# Patient Record
Sex: FEMALE | Race: WHITE | HISPANIC OR LATINO | Employment: FULL TIME | URBAN - METROPOLITAN AREA
[De-identification: names, ages, dates, MRNs, and addresses within clinical notes are randomized per-mention and may not be internally consistent; named-entity substitution may affect disease eponyms.]

---

## 2018-05-31 ENCOUNTER — OFFICE VISIT (OUTPATIENT)
Dept: URGENT CARE | Facility: CLINIC | Age: 53
End: 2018-05-31
Payer: COMMERCIAL

## 2018-05-31 VITALS
HEART RATE: 74 BPM | RESPIRATION RATE: 16 BRPM | DIASTOLIC BLOOD PRESSURE: 52 MMHG | SYSTOLIC BLOOD PRESSURE: 103 MMHG | TEMPERATURE: 96.7 F | OXYGEN SATURATION: 98 %

## 2018-05-31 DIAGNOSIS — L50.9 URTICARIA: Primary | ICD-10-CM

## 2018-05-31 PROCEDURE — 99203 OFFICE O/P NEW LOW 30 MIN: CPT | Performed by: NURSE PRACTITIONER

## 2018-05-31 RX ORDER — PREDNISONE 10 MG/1
TABLET ORAL
Qty: 30 TABLET | Refills: 0 | Status: SHIPPED | OUTPATIENT
Start: 2018-05-31 | End: 2020-11-17 | Stop reason: ALTCHOICE

## 2018-05-31 RX ORDER — RANITIDINE 300 MG/1
300 CAPSULE ORAL 2 TIMES DAILY
Qty: 20 CAPSULE | Refills: 0 | Status: SHIPPED | OUTPATIENT
Start: 2018-05-31 | End: 2021-12-07 | Stop reason: ALTCHOICE

## 2018-05-31 NOTE — PROGRESS NOTES
[email or web address] [facility]        NAME: Ira Parisi is a 47 y o  female  : 1965    MRN: [id number]  DATE: May 31, 2018  TIME: 9:36 AM    Assessment and Plan   Urticaria [L50 9]  1  Urticaria  predniSONE 10 mg tablet    ranitidine (ZANTAC) 300 MG capsule         Patient Instructions   I advised her to use Claritin during the day and Benadryl at night  She may apply hydrocortisone cream as needed  Follow up with PCP in 3-5 days  Proceed to  ER if symptoms worsen  Chief Complaint     Chief Complaint   Patient presents with    Rash     face, x 5 days         History of Present Illness       Rash   The current episode started in the past 7 days  The problem has been gradually worsening since onset  Location: forehead and a couple scattered areas on arms  The rash is characterized by itchiness, redness and swelling  She was exposed to nothing  Past treatments include nothing  Review of Systems   Review of Systems   Constitutional: Negative  HENT: Negative  Eyes: Negative  Respiratory: Negative  Cardiovascular: Negative  Gastrointestinal: Negative  Endocrine: Negative  Genitourinary: Negative  Musculoskeletal: Negative  Skin: Positive for rash  Neurological: Negative  Hematological: Negative  Psychiatric/Behavioral: Negative            Current Medications       Current Outpatient Prescriptions:     predniSONE 10 mg tablet, 5 tabs once daily x 2 days then decrease by 1 tab every other day until finished, Disp: 30 tablet, Rfl: 0    ranitidine (ZANTAC) 300 MG capsule, Take 1 capsule (300 mg total) by mouth 2 (two) times a day for 10 days, Disp: 20 capsule, Rfl: 0    Current Allergies     Allergies as of 2018    (No Known Allergies)            The following portions of the patient's history were reviewed and updated as appropriate: allergies, current medications, past family history, past medical history, past social history, past surgical history and problem list      History reviewed  No pertinent past medical history  No past surgical history on file  No family history on file  Medications have been verified  Objective   /52   Pulse 74   Temp (!) 96 7 °F (35 9 °C)   Resp 16   SpO2 98%        Physical Exam     Physical Exam   Constitutional: She is oriented to person, place, and time  She appears well-developed and well-nourished  No distress  HENT:   Head: Normocephalic and atraumatic  Right Ear: External ear normal    Left Ear: External ear normal    Nose: Nose normal    Mouth/Throat: Oropharynx is clear and moist    Eyes: Conjunctivae and EOM are normal  Pupils are equal, round, and reactive to light  Neck: Normal range of motion  Neck supple  Pulmonary/Chest: Effort normal and breath sounds normal    Abdominal: Soft  Bowel sounds are normal    Neurological: She is alert and oriented to person, place, and time  She has normal reflexes  Skin: Skin is warm and dry  Rash noted  Rash is urticarial (small areas scattered on forehead and left temple  a few area on both forearms  )  She is not diaphoretic  Psychiatric: She has a normal mood and affect  Nursing note and vitals reviewed

## 2018-05-31 NOTE — PATIENT INSTRUCTIONS
Urticaria   WHAT YOU NEED TO KNOW:   Urticaria is also called hives  Hives can change size and shape, and appear anywhere on your skin  They can be mild or severe and last from a few minutes to a few days  Hives may be a sign of a severe allergic reaction called anaphylaxis that needs immediate treatment  Urticaria that lasts longer than 6 weeks may be a chronic condition that needs long-term treatment  DISCHARGE INSTRUCTIONS:   Call 911 for signs or symptoms of anaphylaxis,  such as trouble breathing, swelling in your mouth or throat, or wheezing  You may also have itching, a rash, or feel like you are going to faint  Return to the emergency department if:   · Your heart is beating faster than it normally does  · You have cramping or severe pain in your abdomen  Contact your healthcare provider if:   · You have a fever  · Your skin still itches 24 hours after you take your medicine  · You still have hives after 7 days  · Your joints are painful and swollen  · You have questions or concerns about your condition or care  Medicines:   · Epinephrine  is used to treat severe allergic reactions such as anaphylaxis  · Antihistamines  decrease mild symptoms such as itching or a rash  · Steroids  decrease redness, pain, and swelling  · Take your medicine as directed  Contact your healthcare provider if you think your medicine is not helping or if you have side effects  Tell him of her if you are allergic to any medicine  Keep a list of the medicines, vitamins, and herbs you take  Include the amounts, and when and why you take them  Bring the list or the pill bottles to follow-up visits  Carry your medicine list with you in case of an emergency  Steps to take for signs or symptoms of anaphylaxis:   · Immediately  give 1 shot of epinephrine only into the outer thigh muscle  · Leave the shot in place  as directed   Your healthcare provider may recommend you leave it in place for up to 10 seconds before you remove it  This helps make sure all of the epinephrine is delivered  · Call 911 and go to the emergency department,  even if the shot improved symptoms  Do not drive yourself  Bring the used epinephrine shot with you  Safety precautions to take if you are at risk for anaphylaxis:   · Keep 2 shots of epinephrine with you at all times  You may need a second shot, because epinephrine only works for about 20 minutes and symptoms may return  Your healthcare provider can show you and family members how to give the shot  Check the expiration date every month and replace it before it expires  · Create an action plan  Your healthcare provider can help you create a written plan that explains the allergy and an emergency plan to treat a reaction  The plan explains when to give a second epinephrine shot if symptoms return or do not improve after the first  Give copies of the action plan and emergency instructions to family members, work and school staff, and  providers  Show them how to give a shot of epinephrine  · Be careful when you exercise  If you have had exercise-induced anaphylaxis, do not exercise right after you eat  Stop exercising right away if you start to develop any signs or symptoms of anaphylaxis  You may first feel tired, warm, or have itchy skin  Hives, swelling, and severe breathing problems may develop if you continue to exercise  · Carry medical alert identification  Wear medical alert jewelry or carry a card that explains the allergy  Ask your healthcare provider where to get these items  · Keep a record of triggers and symptoms  Record everything you eat, drink, or apply to your skin for 3 weeks  Include stressful events and what you were doing right before your hives started  Bring the record with you to follow-up visits with your healthcare provider  Manage urticaria:   · Cool your skin  This may help decrease itching  Apply a cool pack to your hives  Dip a hand towel in cool water, wring it out, and place it on your hives  You may also soak your skin in a cool oatmeal bath  · Do not rub your hives  This can irritate your skin and cause more hives  · Wear loose clothing  Tight clothes may irritate your skin and cause more hives  · Manage stress  Stress may trigger hives, or make them worse  Learn new ways to relax, such as deep breathing  Follow up with your healthcare provider as directed:  Write down your questions so you remember to ask them during your visits  © 2017 [address] [employer] Information is for End User's use only and may not be sold, redistributed or otherwise used for commercial purposes  All illustrations and images included in CareNotes® are the copyrighted property of [employer]  or [address]  The above information is an  only  It is not intended as medical advice for individual conditions or treatments  Talk to your doctor, nurse or pharmacist before following any medical regimen to see if it is safe and effective for you

## 2018-09-29 ENCOUNTER — TRANSCRIBE ORDERS (OUTPATIENT)
Dept: ADMINISTRATIVE | Facility: HOSPITAL | Age: 53
End: 2018-09-29

## 2018-09-29 ENCOUNTER — APPOINTMENT (OUTPATIENT)
Dept: LAB | Facility: HOSPITAL | Age: 53
End: 2018-09-29
Payer: COMMERCIAL

## 2018-09-29 DIAGNOSIS — R53.83 FATIGUE, UNSPECIFIED TYPE: ICD-10-CM

## 2018-09-29 DIAGNOSIS — E78.5 HYPERLIPIDEMIA, UNSPECIFIED HYPERLIPIDEMIA TYPE: ICD-10-CM

## 2018-09-29 DIAGNOSIS — D49.3 BREAST TUMOR: ICD-10-CM

## 2018-09-29 DIAGNOSIS — D49.3 BREAST TUMOR: Primary | ICD-10-CM

## 2018-09-29 LAB
ALBUMIN SERPL BCP-MCNC: 4.1 G/DL (ref 3.5–5.7)
ALP SERPL-CCNC: 59 U/L (ref 40–150)
ALT SERPL W P-5'-P-CCNC: 7 U/L (ref 7–52)
ANION GAP SERPL CALCULATED.3IONS-SCNC: 6 MMOL/L (ref 4–13)
AST SERPL W P-5'-P-CCNC: 18 U/L (ref 13–39)
BASOPHILS # BLD AUTO: 0 THOUSANDS/ΜL (ref 0–0.1)
BASOPHILS NFR BLD AUTO: 1 % (ref 0–2)
BILIRUB SERPL-MCNC: 0.5 MG/DL (ref 0.2–1)
BUN SERPL-MCNC: 12 MG/DL (ref 7–25)
CALCIUM SERPL-MCNC: 9.7 MG/DL (ref 8.6–10.5)
CHLORIDE SERPL-SCNC: 106 MMOL/L (ref 98–107)
CHOLEST SERPL-MCNC: 183 MG/DL (ref 0–200)
CO2 SERPL-SCNC: 30 MMOL/L (ref 21–31)
CREAT SERPL-MCNC: 0.77 MG/DL (ref 0.6–1.2)
EOSINOPHIL # BLD AUTO: 0.2 THOUSAND/ΜL (ref 0–0.61)
EOSINOPHIL NFR BLD AUTO: 5 % (ref 0–5)
ERYTHROCYTE [DISTWIDTH] IN BLOOD BY AUTOMATED COUNT: 13.5 % (ref 11.5–14.5)
GFR SERPL CREATININE-BSD FRML MDRD: 90 ML/MIN/1.73SQ M
GLUCOSE P FAST SERPL-MCNC: 92 MG/DL (ref 65–99)
HCT VFR BLD AUTO: 39.8 % (ref 34.8–46.1)
HDLC SERPL-MCNC: 71 MG/DL (ref 40–60)
HGB BLD-MCNC: 13.3 G/DL (ref 12–16)
LDLC SERPL CALC-MCNC: 100 MG/DL (ref 75–193)
LYMPHOCYTES # BLD AUTO: 1.7 THOUSANDS/ΜL (ref 0.6–4.47)
LYMPHOCYTES NFR BLD AUTO: 41 % (ref 21–51)
MCH RBC QN AUTO: 31.1 PG (ref 26–34)
MCHC RBC AUTO-ENTMCNC: 33.3 G/DL (ref 31–37)
MCV RBC AUTO: 93 FL (ref 81–99)
MONOCYTES # BLD AUTO: 0.3 THOUSAND/ΜL (ref 0.17–1.22)
MONOCYTES NFR BLD AUTO: 8 % (ref 2–12)
NEUTROPHILS # BLD AUTO: 2 THOUSANDS/ΜL (ref 1.4–6.5)
NEUTS SEG NFR BLD AUTO: 45 % (ref 42–75)
NONHDLC SERPL-MCNC: 112 MG/DL
NRBC BLD AUTO-RTO: 0 /100 WBCS
PLATELET # BLD AUTO: 222 THOUSANDS/UL (ref 149–390)
PMV BLD AUTO: 7.8 FL (ref 8.6–11.7)
POTASSIUM SERPL-SCNC: 4.2 MMOL/L (ref 3.5–5.5)
PROT SERPL-MCNC: 6.8 G/DL (ref 6.4–8.9)
RBC # BLD AUTO: 4.26 MILLION/UL (ref 3.9–5.2)
SODIUM SERPL-SCNC: 142 MMOL/L (ref 134–143)
TRIGL SERPL-MCNC: 61 MG/DL (ref 44–166)
TSH SERPL DL<=0.05 MIU/L-ACNC: 1.32 UIU/ML (ref 0.45–5.33)
WBC # BLD AUTO: 4.3 THOUSAND/UL (ref 4.8–10.8)

## 2018-09-29 PROCEDURE — 80061 LIPID PANEL: CPT

## 2018-09-29 PROCEDURE — 85025 COMPLETE CBC W/AUTO DIFF WBC: CPT

## 2018-09-29 PROCEDURE — 36415 COLL VENOUS BLD VENIPUNCTURE: CPT

## 2018-09-29 PROCEDURE — 80053 COMPREHEN METABOLIC PANEL: CPT

## 2018-09-29 PROCEDURE — 84443 ASSAY THYROID STIM HORMONE: CPT

## 2019-08-29 ENCOUNTER — OFFICE VISIT (OUTPATIENT)
Dept: FAMILY MEDICINE CLINIC | Facility: CLINIC | Age: 54
End: 2019-08-29
Payer: COMMERCIAL

## 2019-08-29 VITALS
OXYGEN SATURATION: 98 % | WEIGHT: 148.3 LBS | SYSTOLIC BLOOD PRESSURE: 106 MMHG | HEIGHT: 63 IN | BODY MASS INDEX: 26.28 KG/M2 | DIASTOLIC BLOOD PRESSURE: 66 MMHG | HEART RATE: 74 BPM

## 2019-08-29 DIAGNOSIS — C50.919 MALIGNANT NEOPLASM OF FEMALE BREAST, UNSPECIFIED ESTROGEN RECEPTOR STATUS, UNSPECIFIED LATERALITY, UNSPECIFIED SITE OF BREAST (HCC): ICD-10-CM

## 2019-08-29 DIAGNOSIS — E78.5 DYSLIPIDEMIA: ICD-10-CM

## 2019-08-29 DIAGNOSIS — H10.022 MUCOPURULENT CONJUNCTIVITIS OF LEFT EYE: Primary | ICD-10-CM

## 2019-08-29 DIAGNOSIS — R53.83 OTHER FATIGUE: ICD-10-CM

## 2019-08-29 DIAGNOSIS — N64.4 BREAST PAIN: ICD-10-CM

## 2019-08-29 PROCEDURE — 99213 OFFICE O/P EST LOW 20 MIN: CPT | Performed by: FAMILY MEDICINE

## 2019-08-29 RX ORDER — TOBRAMYCIN 3 MG/ML
1 SOLUTION/ DROPS OPHTHALMIC
Qty: 5 ML | Refills: 1 | Status: SHIPPED | OUTPATIENT
Start: 2019-08-29 | End: 2021-12-07 | Stop reason: ALTCHOICE

## 2019-08-29 RX ORDER — ALBUTEROL SULFATE 90 UG/1
2 AEROSOL, METERED RESPIRATORY (INHALATION) AS NEEDED
COMMUNITY
End: 2020-03-17 | Stop reason: SDUPTHER

## 2019-08-29 RX ORDER — TOBRAMYCIN 3 MG/ML
1 SOLUTION/ DROPS OPHTHALMIC
Qty: 5 ML | Refills: 1 | Status: SHIPPED | OUTPATIENT
Start: 2019-08-29 | End: 2019-08-29 | Stop reason: SDUPTHER

## 2019-08-29 NOTE — ASSESSMENT & PLAN NOTE
Patient reports breast pain  She is status post bilateral mastectomy and had breast reconstruction surgery  She does not want a mammogram   She asked if she could have an ultrasound of the breast   This was ordered

## 2019-08-29 NOTE — ASSESSMENT & PLAN NOTE
Patient has an acute conjunctivitis  She was started on antibiotic eyedrops  She was advised to use 1 drop to left eye every 4 hours while awake  I told her to make sure she does not the least 4 times per day  I wanted to use the drops for 5-7 days  Patient has been advised to wash her hands frequently  Her  should wash his hands frequently as well  The patient needs to use a separate washcloth and talc from her   When she improved, the towel, wash cloth, and sheets and pillow cases need to be watched well  Return to office if no improvement or worsens

## 2019-08-29 NOTE — PROGRESS NOTES
Assessment/Plan:    Mucopurulent conjunctivitis of left eye  Patient has an acute conjunctivitis  She was started on antibiotic eyedrops  She was advised to use 1 drop to left eye every 4 hours while awake  I told her to make sure she does not the least 4 times per day  I wanted to use the drops for 5-7 days  Patient has been advised to wash her hands frequently  Her  should wash his hands frequently as well  The patient needs to use a separate washcloth and talc from her   When she improved, the towel, wash cloth, and sheets and pillow cases need to be watched well  Return to office if no improvement or worsens  Breast pain  Patient reports breast pain  She is status post bilateral mastectomy and had breast reconstruction surgery  She does not want a mammogram   She asked if she could have an ultrasound of the breast   This was ordered  Dyslipidemia  Patient's annual fasting blood work was ordered  Diagnoses and all orders for this visit:    Mucopurulent conjunctivitis of left eye  -     Discontinue: tobramycin (TOBREX) 0 3 % SOLN; Administer 1 drop into the left eye every 4 (four) hours while awake  -     tobramycin (TOBREX) 0 3 % SOLN; Administer 1 drop into the left eye every 4 (four) hours while awake    Other fatigue  -     CBC and differential; Future  -     Comprehensive metabolic panel; Future  -     TSH, 3rd generation with Free T4 reflex; Future    Dyslipidemia  -     Lipid panel; Future    Malignant neoplasm of female breast, unspecified estrogen receptor status, unspecified laterality, unspecified site of breast ([facility] [address] )  -     US breast left limited (diagnostic); Future  -     US breast right limited (diagnostic); Future    Breast pain  -     US breast left limited (diagnostic); Future  -     US breast right limited (diagnostic); Future    Other orders  -     albuterol (PROVENTIL HFA,VENTOLIN HFA) 90 mcg/act inhaler;  Inhale 2 puffs as needed for wheezing Subjective:      Patient ID: Kristi Huerta is a 47 y o  female  This patient is a 50-year-old  female who presents to the office today complaining of pinkeye  She tells me she woke up this morning with her eye being red and irritated  She tells me it stings  She reports copious discharge from her eye  She works at [employer]  The following portions of the patient's history were reviewed and updated as appropriate: allergies, current medications, past family history, past medical history, past social history, past surgical history and problem list     Review of Systems   HENT: Negative for congestion, rhinorrhea, sinus pressure, sneezing and sore throat  Cardiovascular: Negative for chest pain and leg swelling  Genitourinary:        Reports bilateral breast pain, right more so than left         Objective:      /66 (BP Location: Left arm, Patient Position: Sitting, Cuff Size: Adult)   Pulse 74   Ht 5' 3" (1 6 m)   Wt 67 3 kg (148 lb 4 8 oz)   SpO2 98%   BMI 26 27 kg/m²          Physical Exam   Constitutional: She appears well-developed and well-nourished  No distress  HENT:   Head: Normocephalic and atraumatic  Right Ear: External ear normal    Left Ear: External ear normal    Mouth/Throat: Oropharynx is clear and moist    Tympanic membranes are clear   Eyes: Pupils are equal, round, and reactive to light  Left eye exhibits discharge  The left eye conjunctiva is injected  There is a small amount of mucoid purulent discharge on the eyelashes  It should be noted that the pupils were equal and reactive to light  Neck: Neck supple  No tracheal deviation present  No thyromegaly present  Cardiovascular: Normal rate, regular rhythm and normal heart sounds  Exam reveals no gallop and no friction rub  No murmur heard  Pulmonary/Chest: Effort normal and breath sounds normal  No stridor  No respiratory distress  She has no wheezes  She has no rales  Abdominal: Soft  Bowel sounds are normal  She exhibits no distension and no mass  There is no tenderness  There is no rebound and no guarding  Lymphadenopathy:     She has no cervical adenopathy  Skin: She is not diaphoretic  Vitals reviewed      Extremities:  Without cyanosis, clubbing, or edema

## 2019-08-30 ENCOUNTER — APPOINTMENT (OUTPATIENT)
Dept: LAB | Facility: HOSPITAL | Age: 54
End: 2019-08-30
Payer: COMMERCIAL

## 2019-08-30 DIAGNOSIS — E78.5 DYSLIPIDEMIA: ICD-10-CM

## 2019-08-30 DIAGNOSIS — R53.83 OTHER FATIGUE: ICD-10-CM

## 2019-08-30 LAB
ALBUMIN SERPL BCP-MCNC: 4.4 G/DL (ref 3.5–5.7)
ALP SERPL-CCNC: 44 U/L (ref 40–150)
ALT SERPL W P-5'-P-CCNC: 9 U/L (ref 7–52)
ANION GAP SERPL CALCULATED.3IONS-SCNC: 6 MMOL/L (ref 4–13)
AST SERPL W P-5'-P-CCNC: 25 U/L (ref 13–39)
BASOPHILS # BLD AUTO: 0 THOUSANDS/ΜL (ref 0–0.1)
BASOPHILS NFR BLD AUTO: 1 % (ref 0–2)
BILIRUB SERPL-MCNC: 0.6 MG/DL (ref 0.2–1)
BUN SERPL-MCNC: 13 MG/DL (ref 7–25)
CALCIUM SERPL-MCNC: 10 MG/DL (ref 8.6–10.5)
CHLORIDE SERPL-SCNC: 103 MMOL/L (ref 98–107)
CHOLEST SERPL-MCNC: 189 MG/DL (ref 0–200)
CO2 SERPL-SCNC: 30 MMOL/L (ref 21–31)
CREAT SERPL-MCNC: 0.87 MG/DL (ref 0.6–1.2)
EOSINOPHIL # BLD AUTO: 0.3 THOUSAND/ΜL (ref 0–0.61)
EOSINOPHIL NFR BLD AUTO: 8 % (ref 0–5)
ERYTHROCYTE [DISTWIDTH] IN BLOOD BY AUTOMATED COUNT: 13.1 % (ref 11.5–14.5)
GFR SERPL CREATININE-BSD FRML MDRD: 77 ML/MIN/1.73SQ M
GLUCOSE P FAST SERPL-MCNC: 79 MG/DL (ref 65–99)
HCT VFR BLD AUTO: 41.2 % (ref 42–47)
HDLC SERPL-MCNC: 56 MG/DL (ref 40–60)
HGB BLD-MCNC: 13.6 G/DL (ref 12–16)
LDLC SERPL CALC-MCNC: 120 MG/DL (ref 0–100)
LYMPHOCYTES # BLD AUTO: 1.9 THOUSANDS/ΜL (ref 0.6–4.47)
LYMPHOCYTES NFR BLD AUTO: 50 % (ref 21–51)
MCH RBC QN AUTO: 30.8 PG (ref 26–34)
MCHC RBC AUTO-ENTMCNC: 33.1 G/DL (ref 31–37)
MCV RBC AUTO: 93 FL (ref 81–99)
MONOCYTES # BLD AUTO: 0.4 THOUSAND/ΜL (ref 0.17–1.22)
MONOCYTES NFR BLD AUTO: 9 % (ref 2–12)
NEUTROPHILS # BLD AUTO: 1.2 THOUSANDS/ΜL (ref 1.4–6.5)
NEUTS SEG NFR BLD AUTO: 32 % (ref 42–75)
NONHDLC SERPL-MCNC: 133 MG/DL
PLATELET # BLD AUTO: 210 THOUSANDS/UL (ref 149–390)
PMV BLD AUTO: 8.7 FL (ref 8.6–11.7)
POTASSIUM SERPL-SCNC: 4 MMOL/L (ref 3.5–5.5)
PROT SERPL-MCNC: 7.5 G/DL (ref 6.4–8.9)
RBC # BLD AUTO: 4.42 MILLION/UL (ref 3.9–5.2)
SODIUM SERPL-SCNC: 139 MMOL/L (ref 134–143)
TRIGL SERPL-MCNC: 66 MG/DL (ref 44–166)
TSH SERPL DL<=0.05 MIU/L-ACNC: 1.66 UIU/ML (ref 0.45–5.33)
WBC # BLD AUTO: 3.8 THOUSAND/UL (ref 4.8–10.8)

## 2019-08-30 PROCEDURE — 84443 ASSAY THYROID STIM HORMONE: CPT

## 2019-08-30 PROCEDURE — 36415 COLL VENOUS BLD VENIPUNCTURE: CPT

## 2019-08-30 PROCEDURE — 85025 COMPLETE CBC W/AUTO DIFF WBC: CPT

## 2019-08-30 PROCEDURE — 80061 LIPID PANEL: CPT

## 2019-08-30 PROCEDURE — 80053 COMPREHEN METABOLIC PANEL: CPT

## 2019-09-13 ENCOUNTER — HOSPITAL ENCOUNTER (OUTPATIENT)
Dept: ULTRASOUND IMAGING | Facility: HOSPITAL | Age: 54
Discharge: HOME | End: 2019-09-13
Payer: COMMERCIAL

## 2019-09-13 DIAGNOSIS — C50.919 MALIGNANT NEOPLASM OF FEMALE BREAST, UNSPECIFIED ESTROGEN RECEPTOR STATUS, UNSPECIFIED LATERALITY, UNSPECIFIED SITE OF BREAST (HCC): ICD-10-CM

## 2019-09-13 DIAGNOSIS — N64.4 BREAST PAIN: ICD-10-CM

## 2019-09-13 PROCEDURE — 76642 ULTRASOUND BREAST LIMITED: CPT

## 2020-03-17 DIAGNOSIS — J45.20 MILD INTERMITTENT ASTHMA, UNSPECIFIED WHETHER COMPLICATED: ICD-10-CM

## 2020-03-17 DIAGNOSIS — N89.8 VAGINAL ITCHING: Primary | ICD-10-CM

## 2020-03-17 RX ORDER — ALBUTEROL SULFATE 90 UG/1
2 AEROSOL, METERED RESPIRATORY (INHALATION) AS NEEDED
Qty: 1 INHALER | Refills: 3 | Status: SHIPPED | OUTPATIENT
Start: 2020-03-17 | End: 2021-12-07 | Stop reason: SDUPTHER

## 2020-03-17 RX ORDER — CLOBETASOL PROPIONATE 0.5 MG/G
1 OINTMENT TOPICAL AS NEEDED
COMMUNITY
End: 2020-03-17 | Stop reason: SDUPTHER

## 2020-03-17 RX ORDER — CLOBETASOL PROPIONATE 0.5 MG/G
1 OINTMENT TOPICAL AS NEEDED
Qty: 30 G | Refills: 0 | Status: SHIPPED | OUTPATIENT
Start: 2020-03-17 | End: 2020-12-08 | Stop reason: SDUPTHER

## 2020-06-14 ENCOUNTER — APPOINTMENT (EMERGENCY)
Dept: RADIOLOGY | Facility: HOSPITAL | Age: 55
End: 2020-06-14
Payer: COMMERCIAL

## 2020-06-14 ENCOUNTER — HOSPITAL ENCOUNTER (EMERGENCY)
Facility: HOSPITAL | Age: 55
Discharge: HOME | End: 2020-06-14
Attending: EMERGENCY MEDICINE | Admitting: EMERGENCY MEDICINE
Payer: COMMERCIAL

## 2020-06-14 VITALS
RESPIRATION RATE: 18 BRPM | SYSTOLIC BLOOD PRESSURE: 121 MMHG | WEIGHT: 155 LBS | OXYGEN SATURATION: 100 % | DIASTOLIC BLOOD PRESSURE: 61 MMHG | HEART RATE: 83 BPM | TEMPERATURE: 97 F | HEIGHT: 63 IN | BODY MASS INDEX: 27.46 KG/M2

## 2020-06-14 DIAGNOSIS — T18.9XXA SWALLOWED FOREIGN BODY: Primary | ICD-10-CM

## 2020-06-14 PROCEDURE — 71045 X-RAY EXAM CHEST 1 VIEW: CPT

## 2020-06-14 PROCEDURE — 99282 EMERGENCY DEPT VISIT SF MDM: CPT | Performed by: EMERGENCY MEDICINE

## 2020-06-14 PROCEDURE — 99283 EMERGENCY DEPT VISIT LOW MDM: CPT

## 2020-06-16 ENCOUNTER — VBI (OUTPATIENT)
Dept: FAMILY MEDICINE CLINIC | Facility: CLINIC | Age: 55
End: 2020-06-16

## 2020-10-14 ENCOUNTER — TELEMEDICINE (OUTPATIENT)
Dept: FAMILY MEDICINE CLINIC | Facility: CLINIC | Age: 55
End: 2020-10-14
Payer: COMMERCIAL

## 2020-10-14 VITALS — WEIGHT: 150 LBS | BODY MASS INDEX: 26.57 KG/M2

## 2020-10-14 DIAGNOSIS — Z20.828 EXPOSURE TO SARS-ASSOCIATED CORONAVIRUS: Primary | ICD-10-CM

## 2020-10-14 PROCEDURE — 1036F TOBACCO NON-USER: CPT | Performed by: FAMILY MEDICINE

## 2020-10-14 PROCEDURE — U0003 INFECTIOUS AGENT DETECTION BY NUCLEIC ACID (DNA OR RNA); SEVERE ACUTE RESPIRATORY SYNDROME CORONAVIRUS 2 (SARS-COV-2) (CORONAVIRUS DISEASE [COVID-19]), AMPLIFIED PROBE TECHNIQUE, MAKING USE OF HIGH THROUGHPUT TECHNOLOGIES AS DESCRIBED BY CMS-2020-01-R: HCPCS | Performed by: FAMILY MEDICINE

## 2020-10-14 PROCEDURE — 99213 OFFICE O/P EST LOW 20 MIN: CPT | Performed by: FAMILY MEDICINE

## 2020-10-15 LAB — SARS-COV-2 RNA SPEC QL NAA+PROBE: NOT DETECTED

## 2020-10-16 ENCOUNTER — TELEPHONE (OUTPATIENT)
Dept: FAMILY MEDICINE CLINIC | Facility: CLINIC | Age: 55
End: 2020-10-16

## 2020-11-17 ENCOUNTER — OFFICE VISIT (OUTPATIENT)
Dept: FAMILY MEDICINE CLINIC | Facility: CLINIC | Age: 55
End: 2020-11-17
Payer: COMMERCIAL

## 2020-11-17 ENCOUNTER — LAB (OUTPATIENT)
Dept: LAB | Facility: CLINIC | Age: 55
End: 2020-11-17
Payer: COMMERCIAL

## 2020-11-17 VITALS
DIASTOLIC BLOOD PRESSURE: 86 MMHG | WEIGHT: 154 LBS | HEART RATE: 76 BPM | OXYGEN SATURATION: 97 % | BODY MASS INDEX: 27.29 KG/M2 | HEIGHT: 63 IN | SYSTOLIC BLOOD PRESSURE: 138 MMHG | TEMPERATURE: 97.6 F

## 2020-11-17 DIAGNOSIS — E78.5 DYSLIPIDEMIA: ICD-10-CM

## 2020-11-17 DIAGNOSIS — M54.16 LUMBAR RADICULOPATHY: Primary | ICD-10-CM

## 2020-11-17 DIAGNOSIS — R53.83 OTHER FATIGUE: ICD-10-CM

## 2020-11-17 LAB
ALBUMIN SERPL BCP-MCNC: 4.2 G/DL (ref 3.5–5)
ALP SERPL-CCNC: 70 U/L (ref 46–116)
ALT SERPL W P-5'-P-CCNC: 27 U/L (ref 12–78)
ANION GAP SERPL CALCULATED.3IONS-SCNC: 3 MMOL/L (ref 4–13)
AST SERPL W P-5'-P-CCNC: 48 U/L (ref 5–45)
BASOPHILS # BLD AUTO: 0.03 THOUSANDS/ΜL (ref 0–0.1)
BASOPHILS NFR BLD AUTO: 1 % (ref 0–1)
BILIRUB SERPL-MCNC: 0.73 MG/DL (ref 0.2–1)
BUN SERPL-MCNC: 15 MG/DL (ref 5–25)
CALCIUM SERPL-MCNC: 9.7 MG/DL (ref 8.3–10.1)
CHLORIDE SERPL-SCNC: 105 MMOL/L (ref 100–108)
CHOLEST SERPL-MCNC: 204 MG/DL (ref 50–200)
CO2 SERPL-SCNC: 30 MMOL/L (ref 21–32)
CREAT SERPL-MCNC: 0.81 MG/DL (ref 0.6–1.3)
EOSINOPHIL # BLD AUTO: 0.22 THOUSAND/ΜL (ref 0–0.61)
EOSINOPHIL NFR BLD AUTO: 5 % (ref 0–6)
ERYTHROCYTE [DISTWIDTH] IN BLOOD BY AUTOMATED COUNT: 12.8 % (ref 11.6–15.1)
GFR SERPL CREATININE-BSD FRML MDRD: 83 ML/MIN/1.73SQ M
GLUCOSE P FAST SERPL-MCNC: 78 MG/DL (ref 65–99)
HCT VFR BLD AUTO: 43.5 % (ref 34.8–46.1)
HDLC SERPL-MCNC: 77 MG/DL
HGB BLD-MCNC: 13.4 G/DL (ref 11.5–15.4)
IMM GRANULOCYTES # BLD AUTO: 0.01 THOUSAND/UL (ref 0–0.2)
IMM GRANULOCYTES NFR BLD AUTO: 0 % (ref 0–2)
LDLC SERPL CALC-MCNC: 116 MG/DL (ref 0–100)
LYMPHOCYTES # BLD AUTO: 1.6 THOUSANDS/ΜL (ref 0.6–4.47)
LYMPHOCYTES NFR BLD AUTO: 37 % (ref 14–44)
MCH RBC QN AUTO: 29.8 PG (ref 26.8–34.3)
MCHC RBC AUTO-ENTMCNC: 30.8 G/DL (ref 31.4–37.4)
MCV RBC AUTO: 97 FL (ref 82–98)
MONOCYTES # BLD AUTO: 0.36 THOUSAND/ΜL (ref 0.17–1.22)
MONOCYTES NFR BLD AUTO: 8 % (ref 4–12)
NEUTROPHILS # BLD AUTO: 2.11 THOUSANDS/ΜL (ref 1.85–7.62)
NEUTS SEG NFR BLD AUTO: 49 % (ref 43–75)
NONHDLC SERPL-MCNC: 127 MG/DL
NRBC BLD AUTO-RTO: 0 /100 WBCS
PLATELET # BLD AUTO: 246 THOUSANDS/UL (ref 149–390)
PMV BLD AUTO: 10.7 FL (ref 8.9–12.7)
POTASSIUM SERPL-SCNC: 4.3 MMOL/L (ref 3.5–5.3)
PROT SERPL-MCNC: 7.6 G/DL (ref 6.4–8.2)
RBC # BLD AUTO: 4.49 MILLION/UL (ref 3.81–5.12)
SODIUM SERPL-SCNC: 138 MMOL/L (ref 136–145)
TRIGL SERPL-MCNC: 57 MG/DL
TSH SERPL DL<=0.05 MIU/L-ACNC: 1.9 UIU/ML (ref 0.36–3.74)
WBC # BLD AUTO: 4.33 THOUSAND/UL (ref 4.31–10.16)

## 2020-11-17 PROCEDURE — 80053 COMPREHEN METABOLIC PANEL: CPT

## 2020-11-17 PROCEDURE — 1036F TOBACCO NON-USER: CPT | Performed by: FAMILY MEDICINE

## 2020-11-17 PROCEDURE — 85025 COMPLETE CBC W/AUTO DIFF WBC: CPT

## 2020-11-17 PROCEDURE — 99213 OFFICE O/P EST LOW 20 MIN: CPT | Performed by: FAMILY MEDICINE

## 2020-11-17 PROCEDURE — 84443 ASSAY THYROID STIM HORMONE: CPT

## 2020-11-17 PROCEDURE — 3008F BODY MASS INDEX DOCD: CPT | Performed by: FAMILY MEDICINE

## 2020-11-17 PROCEDURE — 3725F SCREEN DEPRESSION PERFORMED: CPT | Performed by: FAMILY MEDICINE

## 2020-11-17 PROCEDURE — 80061 LIPID PANEL: CPT

## 2020-11-17 PROCEDURE — 36415 COLL VENOUS BLD VENIPUNCTURE: CPT

## 2020-11-17 RX ORDER — TIZANIDINE 4 MG/1
4 TABLET ORAL
Qty: 10 TABLET | Refills: 0 | Status: SHIPPED | OUTPATIENT
Start: 2020-11-17 | End: 2021-12-07 | Stop reason: ALTCHOICE

## 2020-11-17 RX ORDER — PREDNISONE 20 MG/1
TABLET ORAL
Qty: 14 TABLET | Refills: 0 | Status: SHIPPED | OUTPATIENT
Start: 2020-11-17 | End: 2020-11-29

## 2020-11-23 ENCOUNTER — TELEPHONE (OUTPATIENT)
Dept: FAMILY MEDICINE CLINIC | Facility: CLINIC | Age: 55
End: 2020-11-23

## 2020-12-08 DIAGNOSIS — N89.8 VAGINAL ITCHING: ICD-10-CM

## 2020-12-08 DIAGNOSIS — L85.3 XEROSIS CUTIS: Primary | ICD-10-CM

## 2020-12-08 RX ORDER — AMMONIUM LACTATE 12 G/100G
1 CREAM TOPICAL AS NEEDED
Qty: 385 G | Refills: 1 | Status: SHIPPED | OUTPATIENT
Start: 2020-12-08 | End: 2021-12-07 | Stop reason: SDUPTHER

## 2020-12-08 RX ORDER — CLOBETASOL PROPIONATE 0.5 MG/G
1 OINTMENT TOPICAL AS NEEDED
Qty: 30 G | Refills: 1 | Status: SHIPPED | OUTPATIENT
Start: 2020-12-08 | End: 2021-12-07 | Stop reason: SDUPTHER

## 2020-12-08 RX ORDER — AMMONIUM LACTATE 12 G/100G
1 CREAM TOPICAL AS NEEDED
COMMUNITY
End: 2020-12-08 | Stop reason: SDUPTHER

## 2020-12-31 ENCOUNTER — TELEMEDICINE (OUTPATIENT)
Dept: FAMILY MEDICINE CLINIC | Facility: CLINIC | Age: 55
End: 2020-12-31
Payer: COMMERCIAL

## 2020-12-31 VITALS — BODY MASS INDEX: 26.58 KG/M2 | TEMPERATURE: 98.3 F | WEIGHT: 150 LBS | HEIGHT: 63 IN

## 2020-12-31 DIAGNOSIS — B34.9 VIRAL INFECTION, UNSPECIFIED: Primary | ICD-10-CM

## 2020-12-31 DIAGNOSIS — B34.9 VIRAL INFECTION, UNSPECIFIED: ICD-10-CM

## 2020-12-31 PROCEDURE — 99214 OFFICE O/P EST MOD 30 MIN: CPT | Performed by: FAMILY MEDICINE

## 2020-12-31 PROCEDURE — U0003 INFECTIOUS AGENT DETECTION BY NUCLEIC ACID (DNA OR RNA); SEVERE ACUTE RESPIRATORY SYNDROME CORONAVIRUS 2 (SARS-COV-2) (CORONAVIRUS DISEASE [COVID-19]), AMPLIFIED PROBE TECHNIQUE, MAKING USE OF HIGH THROUGHPUT TECHNOLOGIES AS DESCRIBED BY CMS-2020-01-R: HCPCS | Performed by: FAMILY MEDICINE

## 2020-12-31 PROCEDURE — 3008F BODY MASS INDEX DOCD: CPT | Performed by: FAMILY MEDICINE

## 2021-01-02 LAB — SARS-COV-2 RNA SPEC QL NAA+PROBE: NOT DETECTED

## 2021-02-03 ENCOUNTER — TELEPHONE (OUTPATIENT)
Dept: OBGYN CLINIC | Facility: MEDICAL CENTER | Age: 56
End: 2021-02-03

## 2021-02-03 NOTE — TELEPHONE ENCOUNTER
Called patient to schedule her for an appointment  Left her a voice message for her to call us back and schedule  My chart message will be sent

## 2021-04-05 DIAGNOSIS — Z23 ENCOUNTER FOR IMMUNIZATION: ICD-10-CM

## 2021-04-27 ENCOUNTER — TELEPHONE (OUTPATIENT)
Dept: FAMILY MEDICINE CLINIC | Facility: CLINIC | Age: 56
End: 2021-04-27

## 2021-12-07 ENCOUNTER — OFFICE VISIT (OUTPATIENT)
Dept: FAMILY MEDICINE CLINIC | Facility: CLINIC | Age: 56
End: 2021-12-07
Payer: COMMERCIAL

## 2021-12-07 ENCOUNTER — APPOINTMENT (OUTPATIENT)
Dept: LAB | Facility: CLINIC | Age: 56
End: 2021-12-07
Payer: COMMERCIAL

## 2021-12-07 VITALS
BODY MASS INDEX: 26.98 KG/M2 | TEMPERATURE: 98.5 F | OXYGEN SATURATION: 97 % | HEIGHT: 63 IN | WEIGHT: 152.3 LBS | HEART RATE: 62 BPM | DIASTOLIC BLOOD PRESSURE: 80 MMHG | SYSTOLIC BLOOD PRESSURE: 116 MMHG

## 2021-12-07 DIAGNOSIS — J45.20 MILD INTERMITTENT ASTHMA, UNSPECIFIED WHETHER COMPLICATED: ICD-10-CM

## 2021-12-07 DIAGNOSIS — L85.3 XEROSIS CUTIS: ICD-10-CM

## 2021-12-07 DIAGNOSIS — R53.83 OTHER FATIGUE: ICD-10-CM

## 2021-12-07 DIAGNOSIS — Z12.4 CERVICAL CANCER SCREENING: ICD-10-CM

## 2021-12-07 DIAGNOSIS — Z00.00 ANNUAL PHYSICAL EXAM: Primary | ICD-10-CM

## 2021-12-07 DIAGNOSIS — E78.5 DYSLIPIDEMIA: ICD-10-CM

## 2021-12-07 DIAGNOSIS — K59.04 CHRONIC IDIOPATHIC CONSTIPATION: ICD-10-CM

## 2021-12-07 DIAGNOSIS — C50.919 MALIGNANT NEOPLASM OF FEMALE BREAST, UNSPECIFIED ESTROGEN RECEPTOR STATUS, UNSPECIFIED LATERALITY, UNSPECIFIED SITE OF BREAST (HCC): ICD-10-CM

## 2021-12-07 DIAGNOSIS — N89.8 VAGINAL ITCHING: ICD-10-CM

## 2021-12-07 LAB
ALBUMIN SERPL BCP-MCNC: 3.8 G/DL (ref 3.5–5)
ALP SERPL-CCNC: 61 U/L (ref 46–116)
ALT SERPL W P-5'-P-CCNC: 13 U/L (ref 12–78)
ANION GAP SERPL CALCULATED.3IONS-SCNC: 3 MMOL/L (ref 4–13)
AST SERPL W P-5'-P-CCNC: 19 U/L (ref 5–45)
BILIRUB SERPL-MCNC: 0.55 MG/DL (ref 0.2–1)
BUN SERPL-MCNC: 16 MG/DL (ref 5–25)
CALCIUM SERPL-MCNC: 9.5 MG/DL (ref 8.3–10.1)
CHLORIDE SERPL-SCNC: 107 MMOL/L (ref 100–108)
CHOLEST SERPL-MCNC: 215 MG/DL
CO2 SERPL-SCNC: 28 MMOL/L (ref 21–32)
CREAT SERPL-MCNC: 0.79 MG/DL (ref 0.6–1.3)
GFR SERPL CREATININE-BSD FRML MDRD: 85 ML/MIN/1.73SQ M
GLUCOSE P FAST SERPL-MCNC: 94 MG/DL (ref 65–99)
HDLC SERPL-MCNC: 80 MG/DL
LDLC SERPL CALC-MCNC: 126 MG/DL (ref 0–100)
NONHDLC SERPL-MCNC: 135 MG/DL
POTASSIUM SERPL-SCNC: 4.4 MMOL/L (ref 3.5–5.3)
PROT SERPL-MCNC: 7.6 G/DL (ref 6.4–8.2)
SODIUM SERPL-SCNC: 138 MMOL/L (ref 136–145)
TRIGL SERPL-MCNC: 45 MG/DL
TSH SERPL DL<=0.05 MIU/L-ACNC: 1.86 UIU/ML (ref 0.36–3.74)

## 2021-12-07 PROCEDURE — 84443 ASSAY THYROID STIM HORMONE: CPT

## 2021-12-07 PROCEDURE — 85025 COMPLETE CBC W/AUTO DIFF WBC: CPT

## 2021-12-07 PROCEDURE — 1036F TOBACCO NON-USER: CPT | Performed by: FAMILY MEDICINE

## 2021-12-07 PROCEDURE — 36415 COLL VENOUS BLD VENIPUNCTURE: CPT

## 2021-12-07 PROCEDURE — 99396 PREV VISIT EST AGE 40-64: CPT | Performed by: FAMILY MEDICINE

## 2021-12-07 PROCEDURE — 80061 LIPID PANEL: CPT

## 2021-12-07 PROCEDURE — 80053 COMPREHEN METABOLIC PANEL: CPT

## 2021-12-07 PROCEDURE — 3008F BODY MASS INDEX DOCD: CPT | Performed by: FAMILY MEDICINE

## 2021-12-07 PROCEDURE — 3725F SCREEN DEPRESSION PERFORMED: CPT | Performed by: FAMILY MEDICINE

## 2021-12-07 RX ORDER — AMMONIUM LACTATE 12 G/100G
1 CREAM TOPICAL AS NEEDED
Qty: 385 G | Refills: 1 | Status: SHIPPED | OUTPATIENT
Start: 2021-12-07 | End: 2022-06-21 | Stop reason: RX

## 2021-12-07 RX ORDER — CLOBETASOL PROPIONATE 0.5 MG/G
1 OINTMENT TOPICAL AS NEEDED
Qty: 30 G | Refills: 1 | Status: SHIPPED | OUTPATIENT
Start: 2021-12-07

## 2021-12-07 RX ORDER — LINACLOTIDE 145 UG/1
1 CAPSULE, GELATIN COATED ORAL DAILY
Qty: 30 CAPSULE | Refills: 5 | Status: SHIPPED | OUTPATIENT
Start: 2021-12-07 | End: 2022-05-06 | Stop reason: SINTOL

## 2021-12-07 RX ORDER — ALBUTEROL SULFATE 90 UG/1
2 AEROSOL, METERED RESPIRATORY (INHALATION) EVERY 4 HOURS PRN
Qty: 18 G | Refills: 1 | Status: SHIPPED | OUTPATIENT
Start: 2021-12-07

## 2021-12-08 LAB
BASOPHILS # BLD AUTO: 0.04 THOUSANDS/ΜL (ref 0–0.1)
BASOPHILS NFR BLD AUTO: 1 % (ref 0–1)
EOSINOPHIL # BLD AUTO: 0.24 THOUSAND/ΜL (ref 0–0.61)
EOSINOPHIL NFR BLD AUTO: 6 % (ref 0–6)
ERYTHROCYTE [DISTWIDTH] IN BLOOD BY AUTOMATED COUNT: 13.2 % (ref 11.6–15.1)
HCT VFR BLD AUTO: 45.4 % (ref 34.8–46.1)
HGB BLD-MCNC: 14.2 G/DL (ref 11.5–15.4)
IMM GRANULOCYTES # BLD AUTO: 0.01 THOUSAND/UL (ref 0–0.2)
IMM GRANULOCYTES NFR BLD AUTO: 0 % (ref 0–2)
LYMPHOCYTES # BLD AUTO: 1.76 THOUSANDS/ΜL (ref 0.6–4.47)
LYMPHOCYTES NFR BLD AUTO: 43 % (ref 14–44)
MCH RBC QN AUTO: 31.1 PG (ref 26.8–34.3)
MCHC RBC AUTO-ENTMCNC: 31.3 G/DL (ref 31.4–37.4)
MCV RBC AUTO: 99 FL (ref 82–98)
MONOCYTES # BLD AUTO: 0.33 THOUSAND/ΜL (ref 0.17–1.22)
MONOCYTES NFR BLD AUTO: 8 % (ref 4–12)
NEUTROPHILS # BLD AUTO: 1.72 THOUSANDS/ΜL (ref 1.85–7.62)
NEUTS SEG NFR BLD AUTO: 42 % (ref 43–75)
NRBC BLD AUTO-RTO: 0 /100 WBCS
PLATELET # BLD AUTO: 247 THOUSANDS/UL (ref 149–390)
PMV BLD AUTO: 11.4 FL (ref 8.9–12.7)
RBC # BLD AUTO: 4.57 MILLION/UL (ref 3.81–5.12)
WBC # BLD AUTO: 4.1 THOUSAND/UL (ref 4.31–10.16)

## 2022-01-04 ENCOUNTER — CLINICAL SUPPORT (OUTPATIENT)
Dept: FAMILY MEDICINE CLINIC | Facility: CLINIC | Age: 57
End: 2022-01-04
Payer: COMMERCIAL

## 2022-01-04 DIAGNOSIS — Z11.1 ENCOUNTER FOR PPD TEST: Primary | ICD-10-CM

## 2022-01-04 PROCEDURE — 86580 TB INTRADERMAL TEST: CPT

## 2022-05-06 ENCOUNTER — OFFICE VISIT (OUTPATIENT)
Dept: FAMILY MEDICINE CLINIC | Facility: CLINIC | Age: 57
End: 2022-05-06
Payer: COMMERCIAL

## 2022-05-06 VITALS
WEIGHT: 153.6 LBS | DIASTOLIC BLOOD PRESSURE: 80 MMHG | SYSTOLIC BLOOD PRESSURE: 122 MMHG | BODY MASS INDEX: 27.21 KG/M2 | OXYGEN SATURATION: 98 % | TEMPERATURE: 98.2 F | HEART RATE: 75 BPM | HEIGHT: 63 IN

## 2022-05-06 DIAGNOSIS — A04.8 H. PYLORI INFECTION: ICD-10-CM

## 2022-05-06 DIAGNOSIS — K59.04 CHRONIC IDIOPATHIC CONSTIPATION: ICD-10-CM

## 2022-05-06 DIAGNOSIS — J45.20 MILD INTERMITTENT ASTHMA, UNSPECIFIED WHETHER COMPLICATED: ICD-10-CM

## 2022-05-06 DIAGNOSIS — C50.919 MALIGNANT NEOPLASM OF FEMALE BREAST, UNSPECIFIED ESTROGEN RECEPTOR STATUS, UNSPECIFIED LATERALITY, UNSPECIFIED SITE OF BREAST (HCC): ICD-10-CM

## 2022-05-06 DIAGNOSIS — K21.9 GASTROESOPHAGEAL REFLUX DISEASE WITHOUT ESOPHAGITIS: Primary | ICD-10-CM

## 2022-05-06 PROCEDURE — 3008F BODY MASS INDEX DOCD: CPT | Performed by: FAMILY MEDICINE

## 2022-05-06 PROCEDURE — 99214 OFFICE O/P EST MOD 30 MIN: CPT | Performed by: FAMILY MEDICINE

## 2022-05-06 PROCEDURE — 1036F TOBACCO NON-USER: CPT | Performed by: FAMILY MEDICINE

## 2022-05-06 RX ORDER — PANTOPRAZOLE SODIUM 40 MG/1
40 TABLET, DELAYED RELEASE ORAL
Qty: 30 TABLET | Refills: 5 | Status: SHIPPED | OUTPATIENT
Start: 2022-05-06

## 2022-05-06 NOTE — PATIENT INSTRUCTIONS
Diet for Stomach Ulcers and Gastritis   WHAT YOU NEED TO KNOW:   A diet for stomach ulcers and gastritis is a meal plan that limits foods that irritate your stomach  Certain foods may worsen symptoms such as stomach pain, bloating, heartburn, or indigestion  DISCHARGE INSTRUCTIONS:   Foods to limit or avoid:  You may need to avoid acidic, spicy, or high-fat foods  Not all foods affect everyone the same way  You will need to learn which foods worsen your symptoms and limit those foods  The following are some foods that may worsen ulcer or gastritis symptoms:  · Beverages:      ? Whole milk and chocolate milk    ? Hot cocoa and cola    ? Any beverage with caffeine    ? Regular and decaffeinated coffee    ? Peppermint and spearmint tea    ? Green and black tea, with or without caffeine    ? Orange and grapefruit juices    ? Drinks that contain alcohol    · Spices and seasonings:      ? Black and red pepper    ? Chili powder    ? Mustard seed and nutmeg    · Other foods:      ? Dairy foods made from whole milk or cream    ? Chocolate    ? Spicy or strongly flavored cheeses, such as jalapeno or black pepper    ? Highly seasoned, high-fat meats, such as sausage, salami, rojo, ham, and cold cuts    ? Hot chiles and peppers    ? Tomato products, such as tomato paste, tomato sauce, or tomato juice    Foods to include:  Eat a variety of healthy foods from all the food groups  Eat fruits, vegetables, whole grains, and fat-free or low-fat dairy foods  Whole grains include whole-wheat breads, cereals, pasta, and brown rice  Choose lean meats, poultry (chicken and turkey), fish, beans, eggs, and nuts  A healthy meal plan is low in unhealthy fats, salt, and added sugar  Healthy fats include olive oil and canola oil  Ask your dietitian for more information about a healthy diet  Other helpful guidelines:   · Do not eat right before bedtime  Stop eating at least 2 hours before bedtime  · Eat small, frequent meals    Your stomach may tolerate small, frequent meals better than large meals  © Copyright [employer] 2022 Information is for End User's use only and may not be sold, redistributed or otherwise used for commercial purposes  All illustrations and images included in CareNotes® are the copyrighted property of [employer]  or [employer]  The above information is an  only  It is not intended as medical advice for individual conditions or treatments  Talk to your doctor, nurse or pharmacist before following any medical regimen to see if it is safe and effective for you

## 2022-05-06 NOTE — PROGRESS NOTES
Assessment/Plan:    Gastroesophageal reflux disease without esophagitis  Patient has gastroesophageal reflux disease  I started the patient on pantoprazole 40 mg daily  I told her she must use this every day 1/2 hour before eating  I suggested she take it in the morning and try to eat 1/2 hour later  If she is unable to do that, this should take it 1/2 hour prior to supper  I will re-evaluate the patient in 4 weeks    H  pylori infection  Patient has prior history of H pylori infection  She is concerned about reinfection  She wanted to discuss getting tested  She did not want to have another EGD  Were going to check a stool for H pylori  I will then contact her with the results  Chronic idiopathic constipation  The patient has chronic constipation  She will continue MiraLax and Benefiber for treatment  I think this likely contributes to her acid reflux  Diagnoses and all orders for this visit:    Gastroesophageal reflux disease without esophagitis  -     pantoprazole (PROTONIX) 40 mg tablet; Take 1 tablet (40 mg total) by mouth daily before breakfast    H  pylori infection  -     H  pylori antigen, stool; Future    Malignant neoplasm of female breast, unspecified estrogen receptor status, unspecified laterality, unspecified site [facility] )    Mild intermittent asthma, unspecified whether complicated    Chronic idiopathic constipation          Subjective:      Patient ID: Julio Mahmood is a 55 y o  female  This is a 69-year-old white female who presents to the office today complaining of abdominal pain x2 months duration  Pain is worse morning but tells me that she has it sporadically throughout the rest of the day as well  She tells me it does not seem to be related to food  She only eats at suppertime  She skips breakfast   For lunch, she typically will have a protein shake  She then eats a normal supper  She think she should weigh last based upon what she eats    She complains of belching and gas  She has tried Pepto-Bismol which helps  She also takes Tums 2-4 times per day and tells me this helps  She tells me the only thing that she has noted that makes it worse is eating Cheetos  She does have a history of chronic constipation which she controls with MiraLax and Benefiber  She is status post cholecystectomy  The following portions of the patient's history were reviewed and updated as appropriate: allergies, current medications, past family history, past medical history, past social history, past surgical history and problem list     Review of Systems   Constitutional: Negative for activity change and appetite change  Respiratory: Negative for shortness of breath and wheezing  States asthma has been controlled   Cardiovascular: Negative for chest pain, palpitations and leg swelling  Gastrointestinal: Positive for abdominal pain and constipation  Negative for abdominal distention, blood in stool, diarrhea and nausea  Denies melena and hematochezia         Objective:      /80 (BP Location: Left arm, Patient Position: Sitting, Cuff Size: Adult)   Pulse 75   Temp 98 2 °F (36 8 °C) (Tympanic)   Ht 5' 3" (1 6 m)   Wt 69 7 kg (153 lb 9 6 oz)   SpO2 98%   BMI 27 21 kg/m²          Physical Exam  Vitals reviewed  Constitutional:       Comments: Patient is a 71-year-old  female who appears her stated age  She is pleasant, well-nourished, and in no distress   HENT:      Head: Normocephalic and atraumatic  Right Ear: Tympanic membrane, ear canal and external ear normal       Left Ear: Tympanic membrane, ear canal and external ear normal       Mouth/Throat:      Mouth: Mucous membranes are moist       Pharynx: Oropharynx is clear  No oropharyngeal exudate or posterior oropharyngeal erythema  Eyes:      General: No scleral icterus  Right eye: No discharge  Left eye: No discharge        Conjunctiva/sclera: Conjunctivae normal  Pupils: Pupils are equal, round, and reactive to light  Cardiovascular:      Rate and Rhythm: Normal rate and regular rhythm  Heart sounds: Normal heart sounds  No murmur heard  No friction rub  No gallop  Pulmonary:      Effort: Pulmonary effort is normal  No respiratory distress  Breath sounds: Normal breath sounds  No stridor  No wheezing, rhonchi or rales  Abdominal:      General: Abdomen is flat  Bowel sounds are normal  There is no distension  Palpations: Abdomen is soft  There is no mass  Tenderness: There is no abdominal tenderness  There is no left CVA tenderness or guarding  Comments: There was no hepatosplenomegaly   Musculoskeletal:      Cervical back: Neck supple  Lymphadenopathy:      Cervical: No cervical adenopathy  Psychiatric:         Mood and Affect: Mood normal          Behavior: Behavior normal          Thought Content:  Thought content normal          Judgment: Judgment normal        extremities:  Without cyanosis, clubbing, or edema

## 2022-05-10 PROBLEM — K21.9 GASTROESOPHAGEAL REFLUX DISEASE WITHOUT ESOPHAGITIS: Status: ACTIVE | Noted: 2022-05-10

## 2022-05-10 PROBLEM — A04.8 H. PYLORI INFECTION: Status: ACTIVE | Noted: 2022-05-10

## 2022-05-10 NOTE — ASSESSMENT & PLAN NOTE
Patient has gastroesophageal reflux disease  I started the patient on pantoprazole 40 mg daily  I told her she must use this every day 1/2 hour before eating  I suggested she take it in the morning and try to eat 1/2 hour later  If she is unable to do that, this should take it 1/2 hour prior to supper    I will re-evaluate the patient in 4 weeks

## 2022-05-10 NOTE — ASSESSMENT & PLAN NOTE
Patient has prior history of H pylori infection  She is concerned about reinfection  She wanted to discuss getting tested  She did not want to have another EGD  Were going to check a stool for H pylori  I will then contact her with the results

## 2022-05-10 NOTE — ASSESSMENT & PLAN NOTE
The patient has chronic constipation  She will continue MiraLax and Benefiber for treatment  I think this likely contributes to her acid reflux

## 2022-06-21 DIAGNOSIS — L85.3 XEROSIS CUTIS: Primary | ICD-10-CM

## 2022-06-21 RX ORDER — AMMONIUM LACTATE 12 G/100G
LOTION TOPICAL 2 TIMES DAILY PRN
Qty: 225 G | Refills: 2 | Status: SHIPPED | OUTPATIENT
Start: 2022-06-21 | End: 2023-02-15 | Stop reason: ALTCHOICE

## 2022-10-12 PROBLEM — Z12.4 CERVICAL CANCER SCREENING: Status: RESOLVED | Noted: 2021-12-07 | Resolved: 2022-10-12

## 2022-11-25 ENCOUNTER — OFFICE VISIT (OUTPATIENT)
Dept: FAMILY MEDICINE CLINIC | Facility: CLINIC | Age: 57
End: 2022-11-25

## 2022-11-25 VITALS
OXYGEN SATURATION: 97 % | TEMPERATURE: 98.6 F | BODY MASS INDEX: 28.1 KG/M2 | WEIGHT: 158.6 LBS | SYSTOLIC BLOOD PRESSURE: 120 MMHG | HEIGHT: 63 IN | HEART RATE: 112 BPM | DIASTOLIC BLOOD PRESSURE: 74 MMHG

## 2022-11-25 DIAGNOSIS — J45.20 MILD INTERMITTENT ASTHMA, UNSPECIFIED WHETHER COMPLICATED: ICD-10-CM

## 2022-11-25 DIAGNOSIS — B34.9 VIRAL INFECTION, UNSPECIFIED: ICD-10-CM

## 2022-11-25 DIAGNOSIS — J20.9 ACUTE BRONCHITIS, UNSPECIFIED ORGANISM: Primary | ICD-10-CM

## 2022-11-25 DIAGNOSIS — J45.21 MILD INTERMITTENT ASTHMA WITH (ACUTE) EXACERBATION: ICD-10-CM

## 2022-11-25 LAB
SARS-COV-2 AG UPPER RESP QL IA: NEGATIVE
VALID CONTROL: NORMAL

## 2022-11-25 RX ORDER — METHYLPREDNISOLONE 4 MG/1
TABLET ORAL
Qty: 21 EACH | Refills: 0 | Status: SHIPPED | OUTPATIENT
Start: 2022-11-25

## 2022-11-25 RX ORDER — AZITHROMYCIN 250 MG/1
TABLET, FILM COATED ORAL
Qty: 6 TABLET | Refills: 0 | Status: SHIPPED | OUTPATIENT
Start: 2022-11-25 | End: 2022-11-30

## 2022-11-25 NOTE — PROGRESS NOTES
Name: Pricilla Forrest      : 1965      MRN: [id number]  Encounter Provider: Amanda Castillo DO  Encounter Date: 2022   Encounter department: [facility]     1  Acute bronchitis, unspecified organism  Assessment & Plan:  Patient has an acute bronchitis  She was started on a Zithromax Z-Estrada to take as directed  She was advised to discontinue the DayQuil, which I think is putting her heart rate up making her feel jittery  I recommended she use Robitussin DM as needed  She was advised to increase fluid intake and rest   She may take Tylenol as needed  2  Viral infection, unspecified  Assessment & Plan:  A COVID-19 rapid antigen test was done today in the office  This was negative  Orders:  -     Poct Covid 19 Rapid Antigen Test  -     azithromycin (Zithromax) 250 mg tablet; Take 2 tablets (500 mg total) by mouth daily for 1 day, THEN 1 tablet (250 mg total) daily for 4 days  3  Mild intermittent asthma with (acute) exacerbation  Assessment & Plan:  Patient has a mild exacerbation of her asthma  She was started on a Medrol Dosepak  She will take this as directed  Continue albuterol inhaler p r n     Call if no improvement or worsens  Orders:  -     methylPREDNISolone 4 MG tablet therapy pack; Use as directed on package    BMI Counseling: Body mass index is 28 09 kg/m²  The BMI is above normal  Nutrition recommendations include decreasing portion sizes and moderation in carbohydrate intake  Rationale for BMI follow-up plan is due to patient being overweight or obese  Subjective      This patient is a 81-year-old  female who presents to the office today complaining of trouble breathing  She tells me she started with a cold 4 days ago  She complains of cough and wheezing  She denies any fever or chills  Reports chest congestion  She tells me she has been using her inhaler daily since this began    She did not do a home COVID test on herself  She is currently taking DayQuil and NyQuil  She works at a school  Review of Systems   Constitutional: Negative for chills and fever  HENT: Negative for congestion, ear pain and sore throat  Respiratory: Positive for cough, shortness of breath and wheezing  Cardiovascular: Negative for chest pain, palpitations and leg swelling  Gastrointestinal: Positive for diarrhea (States she had diarrhea yesterday but that has resolved)  Psychiatric/Behavioral:        Reports feeling jittery       Current Outpatient Medications on File Prior to Visit   Medication Sig   • ammonium lactate (LAC-HYDRIN) 12 % lotion Apply topically 2 (two) times a day as needed for dry skin   • clobetasol (TEMOVATE) 0 05 % ointment Apply 1 application topically as needed (VAGINAL ITCH AND DRYNESS) VAGINAL ITCH   • pantoprazole (PROTONIX) 40 mg tablet Take 1 tablet (40 mg total) by mouth daily before breakfast (Patient not taking: Reported on 11/25/2022)       Objective     /74   Pulse (!) 112   Temp 98 6 °F (37 °C) (Tympanic)   Ht 5' 3" (1 6 m)   Wt 71 9 kg (158 lb 9 6 oz)   SpO2 97%   BMI 28 09 kg/m²     Physical Exam  Vitals reviewed  Constitutional:       Comments: This is a 71-year-old  female who appears her stated age  The patient was nonseptic in appearance and in no apparent distress   HENT:      Head: Normocephalic and atraumatic  Right Ear: Tympanic membrane, ear canal and external ear normal       Left Ear: Tympanic membrane, ear canal and external ear normal       Mouth/Throat:      Mouth: Mucous membranes are moist       Pharynx: Oropharynx is clear  No oropharyngeal exudate or posterior oropharyngeal erythema  Eyes:      General: No scleral icterus  Right eye: No discharge  Left eye: No discharge  Conjunctiva/sclera: Conjunctivae normal       Pupils: Pupils are equal, round, and reactive to light  Cardiovascular:      Rate and Rhythm: Regular rhythm  Tachycardia present  Heart sounds: Normal heart sounds  No murmur heard  No gallop  Pulmonary:      Effort: Pulmonary effort is normal  No respiratory distress  Breath sounds: No stridor  Wheezing (A few scattered wheezes were noted) present  No rhonchi or rales  Musculoskeletal:      Cervical back: Neck supple  Lymphadenopathy:      Cervical: No cervical adenopathy       Extremities:  Without cyanosis, clubbing, or edema  Arthor Plant, DO

## 2022-11-25 NOTE — ASSESSMENT & PLAN NOTE
Patient has an acute bronchitis  She was started on a Zithromax Z-Estrada to take as directed  She was advised to discontinue the DayQuil, which I think is putting her heart rate up making her feel jittery  I recommended she use Robitussin DM as needed  She was advised to increase fluid intake and rest   She may take Tylenol as needed

## 2022-11-25 NOTE — ASSESSMENT & PLAN NOTE
Patient has a mild exacerbation of her asthma  She was started on a Medrol Dosepak  She will take this as directed  Continue albuterol inhaler p r n     Call if no improvement or worsens

## 2022-11-28 RX ORDER — ALBUTEROL SULFATE 90 UG/1
AEROSOL, METERED RESPIRATORY (INHALATION)
Qty: 18 G | Refills: 1 | Status: SHIPPED | OUTPATIENT
Start: 2022-11-28

## 2023-01-22 DIAGNOSIS — J45.20 MILD INTERMITTENT ASTHMA, UNSPECIFIED WHETHER COMPLICATED: ICD-10-CM

## 2023-01-23 RX ORDER — ALBUTEROL SULFATE 90 UG/1
AEROSOL, METERED RESPIRATORY (INHALATION)
Qty: 18 G | Refills: 1 | Status: SHIPPED | OUTPATIENT
Start: 2023-01-23

## 2023-02-15 ENCOUNTER — OFFICE VISIT (OUTPATIENT)
Dept: URGENT CARE | Facility: MEDICAL CENTER | Age: 58
End: 2023-02-15

## 2023-02-15 VITALS
WEIGHT: 160 LBS | HEIGHT: 63 IN | DIASTOLIC BLOOD PRESSURE: 78 MMHG | HEART RATE: 87 BPM | RESPIRATION RATE: 20 BRPM | TEMPERATURE: 98.4 F | SYSTOLIC BLOOD PRESSURE: 100 MMHG | OXYGEN SATURATION: 96 % | BODY MASS INDEX: 28.35 KG/M2

## 2023-02-15 DIAGNOSIS — R50.9 FEVER, UNSPECIFIED: ICD-10-CM

## 2023-02-15 DIAGNOSIS — U07.1 COVID-19: Primary | ICD-10-CM

## 2023-02-15 LAB
SARS-COV-2 AG UPPER RESP QL IA: POSITIVE
VALID CONTROL: ABNORMAL

## 2023-02-15 RX ORDER — FLUTICASONE PROPIONATE 50 MCG
1 SPRAY, SUSPENSION (ML) NASAL DAILY
Qty: 11.1 ML | Refills: 0 | Status: SHIPPED | OUTPATIENT
Start: 2023-02-15

## 2023-02-15 NOTE — PROGRESS NOTES
[facility]        NAME: Eloina Jacobs is a 65 y o  female  : 1965    MRN: [id number]  DATE: February 15, 2023  TIME: 1:42 PM    Assessment and Plan   COVID-19 [U07 1]  1  COVID-19        2  Fever, unspecified  Poct Covid 19 Rapid Antigen Test    fluticasone (FLONASE) 50 mcg/act nasal spray            Patient Instructions   Please see the patient's After Visit Summary for patient provided instructions  Other verbal instructions given as noted within  Follow up with PCP in 3-5 days  Proceed to  ER if symptoms worsen  Chief Complaint     Chief Complaint   Patient presents with   • Cold Like Symptoms     Fever of 100 4, coughing, sneezing, productive cough, started , started with vomiting and diarrhea on Monday, c/o headache and body aches          History of Present Illness       Started  with post nasal drip and the Monday symptoms became more severe and she became weak  She has had sinus congestion, rhinorrhea  Cough, fever, sore throat and body aches  TMAX 100 4 At home using tylenol, chloroacetic and robitussin  Also took zyrtec yesterday  Review of Systems   Review of Systems   Constitutional: Positive for appetite change, chills, fatigue and fever  HENT: Positive for congestion, postnasal drip, rhinorrhea, sinus pressure, sinus pain, sore throat and trouble swallowing  Negative for ear pain  Eyes: Negative for pain and visual disturbance  Respiratory: Positive for cough and shortness of breath  Yellow mucus     Cardiovascular: Negative for chest pain and palpitations  Gastrointestinal: Positive for diarrhea and vomiting  Negative for abdominal pain, constipation, nausea and rectal pain  N/V/D on Monday only    Genitourinary: Negative for dysuria and hematuria  Musculoskeletal: Positive for myalgias  Negative for arthralgias and back pain  Skin: Negative for color change and rash     Neurological: Negative for dizziness, seizures, syncope, light-headedness and headaches  All other systems reviewed and are negative  Current Medications       Current Outpatient Medications:   •  albuterol (PROVENTIL HFA,VENTOLIN HFA) 90 mcg/act inhaler, inhale 2 puffs by mouth and INTO THE LUNGS every 4 hours if needed for wheezing, Disp: 18 g, Rfl: 1  •  fluticasone (FLONASE) 50 mcg/act nasal spray, 1 spray into each nostril daily, Disp: 11 1 mL, Rfl: 0    Current Allergies     Allergies as of 02/15/2023   • (No Known Allergies)            The following portions of the patient's history were reviewed and updated as appropriate: allergies, current medications, past family history, past medical history, past social history, past surgical history and problem list      Past Medical History:   Diagnosis Date   • Anxiety    • Breast cancer ([facility] [address] )    • Cancer ([facility] [address] )     breast   • Depression    • History of chemotherapy    • Kidney stone        Past Surgical History:   Procedure Laterality Date   • AUGMENTATION MAMMAPLASTY     • BREAST SURGERY Bilateral    • MASTECTOMY         Family History   Problem Relation Age of Onset   • Asthma Mother    • Diabetes Father    • Hypertension Father    • Alzheimer's disease Father          Medications have been verified  Objective   /78   Pulse 87   Temp 98 4 °F (36 9 °C)   Resp 20   Ht 5' 3" (1 6 m)   Wt 72 6 kg (160 lb)   SpO2 96%   BMI 28 34 kg/m²        Physical Exam     Physical Exam  Vitals and nursing note reviewed  Constitutional:       General: She is not in acute distress  Appearance: Normal appearance  She is normal weight  She is ill-appearing  HENT:      Head: Normocephalic and atraumatic  Right Ear: Tympanic membrane, ear canal and external ear normal  There is impacted cerumen  Left Ear: Tympanic membrane, ear canal and external ear normal  There is impacted cerumen  Nose: Congestion and rhinorrhea present  Mouth/Throat:      Lips: Pink        Mouth: Mucous membranes are moist       Pharynx: Oropharynx is clear  Uvula midline  Posterior oropharyngeal erythema present  No pharyngeal swelling, oropharyngeal exudate or uvula swelling  Tonsils: No tonsillar exudate or tonsillar abscesses  0 on the right  0 on the left  Eyes:      General: Lids are normal       Extraocular Movements: Extraocular movements intact  Conjunctiva/sclera: Conjunctivae normal       Pupils: Pupils are equal, round, and reactive to light  Neck:      Thyroid: No thyroid tenderness  Cardiovascular:      Rate and Rhythm: Normal rate and regular rhythm  Pulses: Normal pulses  Heart sounds: Normal heart sounds, S1 normal and S2 normal    Pulmonary:      Effort: Pulmonary effort is normal       Breath sounds: Normal breath sounds  Abdominal:      General: Abdomen is flat  Bowel sounds are normal       Palpations: Abdomen is soft  Musculoskeletal:         General: Normal range of motion  Cervical back: Normal range of motion and neck supple  Skin:     General: Skin is warm  Capillary Refill: Capillary refill takes less than 2 seconds  Neurological:      General: No focal deficit present  Mental Status: She is alert and oriented to person, place, and time     Psychiatric:         Mood and Affect: Mood normal          Behavior: Behavior normal

## 2023-02-15 NOTE — PATIENT INSTRUCTIONS
The unnecessary use of antibiotics can have harmful affect, unwanted side-effects and can lead to antibiotic resistant bacteria in the future  You are being treated today for a viral illness  Viral illnesses do not require antibiotics, and are treated symptomatically  According to the Centers for Disease Control and Prevention, about one-third of antibiotic use in the outpatient setting, is not needed nor appropriate  Antibiotics treat infections caused by bacteria  But they don't treat infections caused by viruses (viral infections)  For example, an antibiotic is the correct treatment for strep throat, which is caused by bacteria  But it's not the right treatment for most sore throats, which are caused by viruses  By being proactive and treating your individual symptoms, this may help you feel better  You may have had testing done today which when completed and resulted may change the course of your treatment  It is at that time that if a change in your treatment is necessary that you will hear from our office  I would also recommend you follow up with your primary care provider in the next few days  You make take Over the Counter Tylenol (Acetaminophen) and/or Motrin (Ibuprofen) as needed, as directed on packaging  According to the [facility], taking vitamins when you have COVID or other like viral illnesses can improve your immune system     Recommended Vitamins:  Magnesium 350mg/day  Vitamin C 2000mg/day  Vitamin D 500IU/day  Vitamin E 1000mg/day  Zinc 40mg/day

## 2023-02-15 NOTE — LETTER
February 15, 2023     Patient: Eduardo Artis   YOB: 1965   Date of Visit: 2/15/2023       To Whom it May Concern:    Eduardo Artis was seen in my clinic on 2/15/2023  She may return to work on 02/18/2023, and must continue to perform strict masking for an additional 5 days       If you have any questions or concerns, please don't hesitate to call           Sincerely,          FEI Osman        CC: No Recipients

## 2023-03-10 ENCOUNTER — APPOINTMENT (OUTPATIENT)
Dept: LAB | Facility: HOSPITAL | Age: 58
End: 2023-03-10

## 2023-03-10 DIAGNOSIS — Z01.810 PRE-OPERATIVE CARDIOVASCULAR EXAMINATION: ICD-10-CM

## 2023-03-10 LAB
ATRIAL RATE: 75 BPM
P AXIS: 33 DEGREES
PR INTERVAL: 166 MS
QRS AXIS: 46 DEGREES
QRSD INTERVAL: 80 MS
QT INTERVAL: 400 MS
QTC INTERVAL: 446 MS
T WAVE AXIS: 45 DEGREES
VENTRICULAR RATE: 75 BPM

## 2023-04-21 ENCOUNTER — APPOINTMENT (OUTPATIENT)
Dept: LAB | Facility: HOSPITAL | Age: 58
End: 2023-04-21

## 2023-04-21 DIAGNOSIS — L98.7 EXCESS SKIN OF THIGH: ICD-10-CM

## 2023-04-21 DIAGNOSIS — Z01.812 PRE-OPERATIVE LABORATORY EXAMINATION: ICD-10-CM

## 2023-04-21 DIAGNOSIS — Z01.818 OTHER SPECIFIED PRE-OPERATIVE EXAMINATION: ICD-10-CM

## 2023-04-21 LAB
ANION GAP SERPL CALCULATED.3IONS-SCNC: 8 MMOL/L (ref 4–13)
BUN SERPL-MCNC: 14 MG/DL (ref 5–25)
CALCIUM SERPL-MCNC: 9.9 MG/DL (ref 8.4–10.2)
CHLORIDE SERPL-SCNC: 107 MMOL/L (ref 96–108)
CO2 SERPL-SCNC: 26 MMOL/L (ref 21–32)
CREAT SERPL-MCNC: 0.86 MG/DL (ref 0.6–1.3)
ERYTHROCYTE [DISTWIDTH] IN BLOOD BY AUTOMATED COUNT: 13.2 % (ref 11.6–15.1)
GFR SERPL CREATININE-BSD FRML MDRD: 75 ML/MIN/1.73SQ M
GLUCOSE SERPL-MCNC: 116 MG/DL (ref 65–140)
HCT VFR BLD AUTO: 43 % (ref 34.8–46.1)
HGB BLD-MCNC: 13.9 G/DL (ref 11.5–15.4)
MCH RBC QN AUTO: 30.8 PG (ref 26.8–34.3)
MCHC RBC AUTO-ENTMCNC: 32.3 G/DL (ref 31.4–37.4)
MCV RBC AUTO: 95 FL (ref 82–98)
PLATELET # BLD AUTO: 264 THOUSANDS/UL (ref 149–390)
PMV BLD AUTO: 9.3 FL (ref 8.9–12.7)
POTASSIUM SERPL-SCNC: 3.7 MMOL/L (ref 3.5–5.3)
RBC # BLD AUTO: 4.51 MILLION/UL (ref 3.81–5.12)
SODIUM SERPL-SCNC: 141 MMOL/L (ref 135–147)
WBC # BLD AUTO: 5.26 THOUSAND/UL (ref 4.31–10.16)

## 2023-05-03 ENCOUNTER — HOSPITAL ENCOUNTER (EMERGENCY)
Facility: HOSPITAL | Age: 58
Discharge: HOME | End: 2023-05-03
Attending: EMERGENCY MEDICINE

## 2023-05-03 ENCOUNTER — APPOINTMENT (EMERGENCY)
Dept: CT IMAGING | Facility: HOSPITAL | Age: 58
End: 2023-05-03

## 2023-05-03 VITALS
TEMPERATURE: 97.5 F | HEART RATE: 69 BPM | OXYGEN SATURATION: 95 % | RESPIRATION RATE: 16 BRPM | SYSTOLIC BLOOD PRESSURE: 144 MMHG | DIASTOLIC BLOOD PRESSURE: 82 MMHG

## 2023-05-03 DIAGNOSIS — R51.9 ACUTE NONINTRACTABLE HEADACHE: ICD-10-CM

## 2023-05-03 DIAGNOSIS — H53.412: Primary | ICD-10-CM

## 2023-05-03 RX ORDER — METOCLOPRAMIDE HYDROCHLORIDE 5 MG/ML
10 INJECTION INTRAMUSCULAR; INTRAVENOUS ONCE
Status: COMPLETED | OUTPATIENT
Start: 2023-05-03 | End: 2023-05-03

## 2023-05-03 RX ORDER — DIPHENHYDRAMINE HYDROCHLORIDE 50 MG/ML
25 INJECTION INTRAMUSCULAR; INTRAVENOUS ONCE
Status: COMPLETED | OUTPATIENT
Start: 2023-05-03 | End: 2023-05-03

## 2023-05-03 RX ORDER — DEXAMETHASONE SODIUM PHOSPHATE 4 MG/ML
8 INJECTION, SOLUTION INTRA-ARTICULAR; INTRALESIONAL; INTRAMUSCULAR; INTRAVENOUS; SOFT TISSUE ONCE
Status: COMPLETED | OUTPATIENT
Start: 2023-05-03 | End: 2023-05-03

## 2023-05-03 RX ADMIN — DEXAMETHASONE SODIUM PHOSPHATE 8 MG: 4 INJECTION, SOLUTION INTRAMUSCULAR; INTRAVENOUS at 11:49

## 2023-05-03 RX ADMIN — DIPHENHYDRAMINE HYDROCHLORIDE 25 MG: 50 INJECTION, SOLUTION INTRAMUSCULAR; INTRAVENOUS at 11:46

## 2023-05-03 RX ADMIN — METOCLOPRAMIDE 10 MG: 5 INJECTION, SOLUTION INTRAMUSCULAR; INTRAVENOUS at 11:47

## 2023-05-03 NOTE — ED NOTES
Patient transported to [facility] [address] [place] [address],[address], RN  05/03/23 1526

## 2023-05-03 NOTE — ED PROVIDER NOTES
"History  Chief Complaint   Patient presents with   • Headache     Patient states around 10am she was at work when her vision in her left eye felt like it was \"vibrating\"  Patient now developed a headache 7/10  Denies any blurred vision at this time     This is a 66 y/o woman with the noted PMH who presents to the ED with transient left-sided visual disturbance beginning at about 1000 this morning and lasting approximately 45 minutes followed thereafter by the development of a bibasilar/bitemporal dull/throbbing headache  The visual disturbance consisted of a \"vibration\" of the left superior temporal visual field without any vision loss  This abnormality migrated into the left inferior temporal visual field over several minutes, then migrated back into the left superior temporal visual field and resolved  At this point, she has a headache only  She does not have any visual disturbances  At no time did she have any neck pain/nausea/vomiting/extremity weakness/extremity paresthesias/dysarthria/aphasia  The headache is not exertional   It did not begin in a thunderclap fashion  She did not take or use anything for her symptoms  She was otherwise in her normal state of health until symptom onset  No prior CVA hx  She does not have a diagnosed history of migraine headache although she states she has had a long history of headaches that she attributes to poor sleep and effects of depression/anxiety  These headaches have always been more generalized and dull/throbbing without any associated visual changes  She does wear glasses for reading but does not have any other vision abnormalities  She has never had ocular surgery  No preceding head trauma/injury prior to the onset of symptoms  She does not follow with a neurologist     A/P: Symptoms are certainly suggestive of migraine headache with aura although this is a first-time the patient has had headache such as this    She does not have any accompanying " neurologic symptoms  Overall, sudden onset of a new headache with visual disturbances may ultimately be due to an underlying primary headache disorder, but neuroimaging is certainly indicated at least at this interval   At the same time, treatment as for migraine headache is most reasonable  We will do so now and if no concerning findings on neuroimaging, will have her evaluated by primary physician  History provided by:  Medical records and patient  Headache  Associated symptoms: no eye pain, no nausea, no photophobia and no vomiting        Prior to Admission Medications   Prescriptions Last Dose Informant Patient Reported? Taking? albuterol (PROVENTIL HFA,VENTOLIN HFA) 90 mcg/act inhaler   No No   Sig: inhale 2 puffs by mouth and INTO THE LUNGS every 4 hours if needed for wheezing   fluticasone (FLONASE) 50 mcg/act nasal spray   No No   Si spray into each nostril daily      Facility-Administered Medications: None       Past Medical History:   Diagnosis Date   • Anxiety    • [email or web address]    • Cancer ([facility] )     breast   • Depression    • History of chemotherapy    • Kidney stone        Past Surgical History:   Procedure Laterality Date   • AUGMENTATION MAMMAPLASTY     • BREAST SURGERY Bilateral    • MASTECTOMY         Family History   Problem Relation Age of Onset   • Asthma Mother    • Diabetes Father    • Hypertension Father    • Alzheimer's disease Father      I have reviewed and agree with the history as documented  E-Cigarette/Vaping   • E-Cigarette Use Never User      E-Cigarette/Vaping Substances   • Nicotine No    • THC No    • CBD No    • Flavoring No    • Other No    • Unknown No      Social History     Tobacco Use   • Smoking status: Never     Passive exposure: Never   • Smokeless tobacco: Never   Vaping Use   • Vaping Use: Never used   Substance Use Topics   • Alcohol use: Yes     Comment: social   • Drug use: Never       Review of Systems   Eyes: Positive for visual disturbance  Negative for photophobia, pain and redness  Respiratory: Negative  Cardiovascular: Negative  Gastrointestinal: Negative for nausea and vomiting  Skin: Negative  Neurological: Positive for headaches  Negative for tremors, syncope, speech difficulty and light-headedness  All other systems reviewed and are negative  Physical Exam  Physical Exam  Vitals and nursing note reviewed  Constitutional:       General: She is awake  She is not in acute distress  Appearance: Normal appearance  She is well-developed  HENT:      Head: Normocephalic and atraumatic  Right Ear: Hearing and external ear normal       Left Ear: Hearing and external ear normal    Eyes:      General: Lids are normal  Vision grossly intact  Extraocular Movements: Extraocular movements intact  Conjunctiva/sclera: Conjunctivae normal       Pupils: Pupils are equal, round, and reactive to light  Funduscopic exam:     Right eye: No hemorrhage, exudate, AV nicking or papilledema  Venous pulsations present  Left eye: No hemorrhage, exudate, AV nicking or papilledema  Venous pulsations present  Visual Fields: Right eye visual fields normal and left eye visual fields normal       Comments: Visual acuity per RN notes   Neck:      Trachea: Trachea and phonation normal    Cardiovascular:      Rate and Rhythm: Normal rate and regular rhythm  Pulses:           Radial pulses are 2+ on the right side and 2+ on the left side  Dorsalis pedis pulses are 2+ on the right side and 2+ on the left side  Posterior tibial pulses are 2+ on the right side and 2+ on the left side  Heart sounds: Normal heart sounds, S1 normal and S2 normal  No murmur heard  No friction rub  No gallop  Pulmonary:      Effort: Pulmonary effort is normal  No respiratory distress  Breath sounds: Normal breath sounds  No stridor  No decreased breath sounds, wheezing, rhonchi or rales     Skin:     General: Skin is warm and dry  Neurological:      Mental Status: She is alert and oriented to person, place, and time  GCS: GCS eye subscore is 4  GCS verbal subscore is 5  GCS motor subscore is 6  Cranial Nerves: No cranial nerve deficit  Sensory: No sensory deficit  Motor: No abnormal muscle tone  Comments: PERRLA; EOMI  Sensation intact to light touch over face in V1-V3 distribution bilaterally  Facial expressions symmetric  Tongue/uvula midline  Shoulder shrug equal bilaterally  Strength 5/5 in UE/LE bilaterally  Sensation intact to light touch in UE/LE bilaterally  Vital Signs  ED Triage Vitals   Temperature Pulse Respirations Blood Pressure SpO2   05/03/23 1100 05/03/23 1100 05/03/23 1100 05/03/23 1105 05/03/23 1100   97 5 °F (36 4 °C) 71 16 128/67 98 %      Temp Source Heart Rate Source Patient Position - Orthostatic VS BP Location FiO2 (%)   05/03/23 1100 05/03/23 1100 -- -- --   Temporal Monitor         Pain Score       05/03/23 1241       2           Vitals:    05/03/23 1100 05/03/23 1105 05/03/23 1230   BP:  128/67 144/82   Pulse: 71  69         Visual Acuity  Visual Acuity    Flowsheet Row Most Recent Value   Visual acuity R eye is 20/25   Visual acuity Left eye is 20/20   Visual acuity in both eyes is 20/20          ED Medications  Medications   metoclopramide (REGLAN) injection 10 mg (10 mg Intravenous Given 5/3/23 1147)   diphenhydrAMINE (BENADRYL) injection 25 mg (25 mg Intravenous Given 5/3/23 1146)   dexamethasone (DECADRON) injection 8 mg (8 mg Intravenous Given 5/3/23 1149)       Diagnostic Studies  Results Reviewed     None                 CT head without contrast   Final Result by Marvin Romano MD (05/03 1255)      No acute intracranial abnormality                    Workstation performed: [id number]                    Procedures  Procedures         ED Course  ED Course as of 05/03/23 1317   Wed May 03, 2023   1146 CT completed and awaiting interpretation   1257 CT head without contrast  FINDINGS:     PARENCHYMA:  No intracranial mass, mass effect or midline shift  No CT signs of acute infarction  No acute parenchymal hemorrhage      VENTRICLES AND EXTRA-AXIAL SPACES:  Normal for the patient's age      VISUALIZED ORBITS: Normal visualized orbits      PARANASAL SINUSES: Mild mucosal thickening of the visualized paranasal sinuses      CALVARIUM AND EXTRACRANIAL SOFT TISSUES:  Normal      IMPRESSION:     No acute intracranial abnormality  Medical Decision Making  CT of the head was negative for any acute pathology  In particular, there was a normal CT scan of the head performed within 6 hours of symptom onset in a patient without particularly high concern for subarachnoid hemorrhage effectively rules out this diagnosis  Apart from this, the symptoms are generally consistent with a migraine with aura  As noted previously, this is the first time patient has had the symptoms, and this would be somewhat unusual for the patient be experiencing a first-time migraine headache at age 65  She does not have any ocular abnormalities identified on examination, suggesting that the headache was the primary etiology of her symptoms and the visual symptoms were secondary  Patient's headache did markedly improve with treatment in the emergency department  There is no indication to start any prophylactic or complex migraine abortive therapy at this point  I did advise that she contact her primary physician to discuss the situation and evaluate the need for any further investigation  She should immediately return to the emergency department for any complicated neurologic symptoms  All questions were answered to patient satisfaction prior to discharge  She expressed understanding and agreed to plan  Amount and/or Complexity of Data Reviewed  Radiology: ordered  Decision-making details documented in ED Course  Risk  Prescription drug management            Disposition  Final diagnoses:   Transient peripheral scotoma of left eye   Acute nonintractable headache     Time reflects when diagnosis was documented in both MDM as applicable and the Disposition within this note     Time User Action Codes Description Comment    5/3/2023 12:58 PM Renee Abbott Add [Q50 505] Peripheral scotoma of left eye     5/3/2023 12:58 PM Renee Abbott Modify [I33 354] Transient peripheral scotoma of left eye     5/3/2023 12:58 PM Renee Abbott Add [R51 9] Acute nonintractable headache       ED Disposition     ED Disposition   Discharge    Condition   Stable    Date/Time   Wed May 3, 2023 12:57 PM    Comment   Rufina Joshua discharge to home/self care  Follow-up Information     Follow up With Specialties Details Why Contact Info    Juan Hester DO Family Medicine Schedule an appointment as soon as possible for a visit in 1 week For recheck of your symptoms [facility] [address]  [address]  [phone]            Discharge Medication List as of 5/3/2023  1:00 PM      CONTINUE these medications which have NOT CHANGED    Details   albuterol (PROVENTIL HFA,VENTOLIN HFA) 90 mcg/act inhaler inhale 2 puffs by mouth and INTO THE LUNGS every 4 hours if needed for wheezing, Normal      fluticasone (FLONASE) 50 mcg/act nasal spray 1 spray into each nostril daily, Starting Wed 2/15/2023, Normal             No discharge procedures on file      PDMP Review     None          ED Provider  Electronically Signed by           Charmaine Hughes DO  05/03/23 3206

## 2023-05-03 NOTE — DISCHARGE INSTRUCTIONS
Please continue all your medications at their current dosages and frequencies  You should make an appointment with your regular doctor in the next 1-2 weeks for recheck of your symptoms  If you suddenly lose vision in one eye, develop difficulty speaking, or develop weakness or numbness on one side of your body, please go to the ER right away

## 2023-05-04 ENCOUNTER — VBI (OUTPATIENT)
Dept: ADMINISTRATIVE | Facility: OTHER | Age: 58
End: 2023-05-04

## 2023-05-04 NOTE — TELEPHONE ENCOUNTER
Jocelin Todd    ED Visit Information     Ed visit date: 5-3-23  Diagnosis Description: headache  In Network? Yes Yvonne Hernandez  Discharge status: Home  Discharged with meds ? No  Number of ED visits to date: 1  ED Severity:3     Outreach Information    Outreach successful: No 2  Date letter mailed:5-5-23  Date Finalized:5-5-23    Care Coordination    Follow up appointment with pcp: no none  Transportation issues ?  NA    Value Base Outreach    Outreach type: 3 Day Outreach  Emergent necessity warranted by diagnosis: Yes  [facility]: Yes  Transportation: Self Transport  05/04/2023 12:01 PM EDT by Lincoln Rich MA 05/04/2023 12:01 PM EDT by NEETA Oswald [place] (Self) [phone][phone] (Mobile)  [phone] (Mobile)   - No Answer/BusyCommunicated -   05/05/2023 10:38 AM EDT by Lincoln Rich MA 05/05/2023 10:38 AM EDT by NEETA Oswald [facility] (Self) [phone] (HZLAEB)[phone] (Mobile)  [phone] (Mobile) Remove  - No Answer/BusyCommunicated - letter sent

## 2023-05-04 NOTE — LETTER
VALUE BASED VIR  [address] [facility] [address]    Date: 05/05/23  [facility] [address]  [address]    Dear Corrine Camargo: Thank you for choosing [facility] emergency department for care  Your primary care provider wants to make sure that your ongoing medical care is being addressed  If you require follow up care as a result of your emergency department visit, there are a few things the practice would like you to know  As part of the network's continuing commitment to caring for our patients, we have added more same day appointments and have extended office hours to meet your medical needs  After hours, on-call physicians are available via your primary care provider's main office line  We encourage you to contact our office prior to seeking treatment to discuss your symptoms with the medical staff  Together, we can determine the correct course of action  A majority of non-emergent conditions such as: common cold, flu-like symptoms, fevers, strains/sprains, dislocations, minor burns, cuts and animal bites can be treated at [address] [place] [address] facilities  Diagnostic testing is available at some sites  Of course, if you are experiencing a life threatening medical emergency call 911 or proceed directly to the nearest emergency room  Your nearest [address] [place] [address] facility is conveniently located at:    [address] [place] [address]  [facility], [address] [place] [address] Drive, [address]  [phone]  [address] offered at most [facility] locations  Cynthiafort your spot online at [email or web address] or on the [facility] 67    Sincerely,    VALUE BASED VIR  No information on file

## 2023-05-23 NOTE — PRE-PROCEDURE INSTRUCTIONS
Pre-Surgery Instructions:   Medication Instructions   • albuterol (PROVENTIL HFA,VENTOLIN HFA) 90 mcg/act inhaler Uses PRN- OK to take day of surgery   • fluticasone (FLONASE) 50 mcg/act nasal spray Uses PRN- OK to take day of surgery        Medication instructions for day surgery reviewed  Please use only a sip of water to take your instructed medications  Avoid all over the counter vitamins, supplements and NSAIDS for one week prior to surgery per anesthesia guidelines  Tylenol is ok to take as needed  You will receive a call one business day prior to surgery with an arrival time and hospital directions  If your surgery is scheduled on a Monday, the hospital will be calling you on the Friday prior to your surgery  If you have not heard from anyone by 8pm, please call the hospital supervisor through the hospital  at [phone]  Grover Dilcia [phone])  Do not eat or drink anything after midnight the night before your surgery, including candy, mints, lifesavers, or chewing gum  Do not drink alcohol 24hrs before your surgery  Try not to smoke at least 24hrs before your surgery  Follow the pre surgery showering instructions as listed in the [facility] Surgical Experience Booklet” or otherwise provided by your surgeon's office  Do not shave the surgical area 24 hours before surgery  Do not apply any lotions, creams, including makeup, cologne, deodorant, or perfumes after showering on the day of your surgery  No contact lenses, eye make-up, or artificial eyelashes  Remove nail polish, including gel polish, and any artificial, gel, or acrylic nails if possible  Remove all jewelry including rings and body piercing jewelry  Wear causal clothing that is easy to take on and off  Consider your type of surgery  Keep any valuables, jewelry, piercings at home  Please bring any specially ordered equipment (sling, braces) if indicated      Arrange for a responsible person to drive you to and from the hospital on the day of your surgery  Visitor Guidelines discussed  Call the surgeon's office with any new illnesses, exposures, or additional questions prior to surgery  Please reference your [facility] Surgical Experience Booklet” for additional information to prepare for your upcoming surgery

## 2023-05-25 ENCOUNTER — ANESTHESIA EVENT (OUTPATIENT)
Dept: PERIOP | Facility: HOSPITAL | Age: 58
End: 2023-05-25

## 2023-05-26 ENCOUNTER — ANESTHESIA (OUTPATIENT)
Dept: PERIOP | Facility: HOSPITAL | Age: 58
End: 2023-05-26

## 2023-05-26 ENCOUNTER — HOSPITAL ENCOUNTER (OUTPATIENT)
Facility: HOSPITAL | Age: 58
Setting detail: OUTPATIENT SURGERY
Discharge: HOME | End: 2023-05-26
Attending: PLASTIC SURGERY | Admitting: PLASTIC SURGERY

## 2023-05-26 VITALS
OXYGEN SATURATION: 95 % | WEIGHT: 160 LBS | HEIGHT: 63 IN | HEART RATE: 84 BPM | RESPIRATION RATE: 18 BRPM | DIASTOLIC BLOOD PRESSURE: 77 MMHG | SYSTOLIC BLOOD PRESSURE: 133 MMHG | BODY MASS INDEX: 28.35 KG/M2 | TEMPERATURE: 97.5 F

## 2023-05-26 RX ORDER — ONDANSETRON 2 MG/ML
INJECTION INTRAMUSCULAR; INTRAVENOUS AS NEEDED
Status: DISCONTINUED | OUTPATIENT
Start: 2023-05-26 | End: 2023-05-26

## 2023-05-26 RX ORDER — CEFAZOLIN SODIUM 1 G/50ML
1000 SOLUTION INTRAVENOUS ONCE
Status: COMPLETED | OUTPATIENT
Start: 2023-05-26 | End: 2023-05-26

## 2023-05-26 RX ORDER — NEOSTIGMINE METHYLSULFATE 1 MG/ML
INJECTION INTRAVENOUS AS NEEDED
Status: DISCONTINUED | OUTPATIENT
Start: 2023-05-26 | End: 2023-05-26

## 2023-05-26 RX ORDER — HYDROMORPHONE HCL IN WATER/PF 6 MG/30 ML
0.2 PATIENT CONTROLLED ANALGESIA SYRINGE INTRAVENOUS
Status: DISCONTINUED | OUTPATIENT
Start: 2023-05-26 | End: 2023-05-26 | Stop reason: HOSPADM

## 2023-05-26 RX ORDER — ONDANSETRON 4 MG/1
4 TABLET, ORALLY DISINTEGRATING ORAL ONCE
Status: COMPLETED | OUTPATIENT
Start: 2023-05-26 | End: 2023-05-26

## 2023-05-26 RX ORDER — DEXAMETHASONE SODIUM PHOSPHATE 10 MG/ML
INJECTION, SOLUTION INTRAMUSCULAR; INTRAVENOUS AS NEEDED
Status: DISCONTINUED | OUTPATIENT
Start: 2023-05-26 | End: 2023-05-26

## 2023-05-26 RX ORDER — OXYCODONE HYDROCHLORIDE 5 MG/1
5 TABLET ORAL EVERY 4 HOURS PRN
Status: DISCONTINUED | OUTPATIENT
Start: 2023-05-26 | End: 2023-05-26 | Stop reason: HOSPADM

## 2023-05-26 RX ORDER — MIDAZOLAM HYDROCHLORIDE 2 MG/2ML
INJECTION, SOLUTION INTRAMUSCULAR; INTRAVENOUS AS NEEDED
Status: DISCONTINUED | OUTPATIENT
Start: 2023-05-26 | End: 2023-05-26

## 2023-05-26 RX ORDER — SODIUM CHLORIDE, SODIUM LACTATE, POTASSIUM CHLORIDE, CALCIUM CHLORIDE 600; 310; 30; 20 MG/100ML; MG/100ML; MG/100ML; MG/100ML
50 INJECTION, SOLUTION INTRAVENOUS CONTINUOUS
Status: DISCONTINUED | OUTPATIENT
Start: 2023-05-26 | End: 2023-05-26 | Stop reason: HOSPADM

## 2023-05-26 RX ORDER — ONDANSETRON 2 MG/ML
4 INJECTION INTRAMUSCULAR; INTRAVENOUS ONCE AS NEEDED
Status: DISCONTINUED | OUTPATIENT
Start: 2023-05-26 | End: 2023-05-26 | Stop reason: HOSPADM

## 2023-05-26 RX ORDER — MAGNESIUM HYDROXIDE 1200 MG/15ML
LIQUID ORAL AS NEEDED
Status: DISCONTINUED | OUTPATIENT
Start: 2023-05-26 | End: 2023-05-26 | Stop reason: HOSPADM

## 2023-05-26 RX ORDER — FENTANYL CITRATE/PF 50 MCG/ML
25 SYRINGE (ML) INJECTION
Status: DISCONTINUED | OUTPATIENT
Start: 2023-05-26 | End: 2023-05-26 | Stop reason: HOSPADM

## 2023-05-26 RX ORDER — GABAPENTIN 300 MG/1
300 CAPSULE ORAL ONCE
Status: COMPLETED | OUTPATIENT
Start: 2023-05-26 | End: 2023-05-26

## 2023-05-26 RX ORDER — PROPOFOL 10 MG/ML
INJECTION, EMULSION INTRAVENOUS AS NEEDED
Status: DISCONTINUED | OUTPATIENT
Start: 2023-05-26 | End: 2023-05-26

## 2023-05-26 RX ORDER — MINERAL OIL
OIL (ML) MISCELLANEOUS AS NEEDED
Status: DISCONTINUED | OUTPATIENT
Start: 2023-05-26 | End: 2023-05-26 | Stop reason: HOSPADM

## 2023-05-26 RX ORDER — ROCURONIUM BROMIDE 10 MG/ML
INJECTION, SOLUTION INTRAVENOUS AS NEEDED
Status: DISCONTINUED | OUTPATIENT
Start: 2023-05-26 | End: 2023-05-26

## 2023-05-26 RX ORDER — GLYCOPYRROLATE 0.2 MG/ML
INJECTION INTRAMUSCULAR; INTRAVENOUS AS NEEDED
Status: DISCONTINUED | OUTPATIENT
Start: 2023-05-26 | End: 2023-05-26

## 2023-05-26 RX ORDER — FENTANYL CITRATE 50 UG/ML
INJECTION, SOLUTION INTRAMUSCULAR; INTRAVENOUS AS NEEDED
Status: DISCONTINUED | OUTPATIENT
Start: 2023-05-26 | End: 2023-05-26

## 2023-05-26 RX ORDER — ACETAMINOPHEN 325 MG/1
650 TABLET ORAL ONCE
Status: COMPLETED | OUTPATIENT
Start: 2023-05-26 | End: 2023-05-26

## 2023-05-26 RX ORDER — ONDANSETRON 2 MG/ML
4 INJECTION INTRAMUSCULAR; INTRAVENOUS EVERY 8 HOURS PRN
Status: DISCONTINUED | OUTPATIENT
Start: 2023-05-26 | End: 2023-05-26 | Stop reason: HOSPADM

## 2023-05-26 RX ORDER — PROPOFOL 10 MG/ML
INJECTION, EMULSION INTRAVENOUS CONTINUOUS PRN
Status: DISCONTINUED | OUTPATIENT
Start: 2023-05-26 | End: 2023-05-26

## 2023-05-26 RX ORDER — ACETAMINOPHEN 325 MG/1
650 TABLET ORAL EVERY 6 HOURS PRN
Status: DISCONTINUED | OUTPATIENT
Start: 2023-05-26 | End: 2023-05-26 | Stop reason: HOSPADM

## 2023-05-26 RX ORDER — OXYCODONE HYDROCHLORIDE 5 MG/1
10 TABLET ORAL EVERY 4 HOURS PRN
Status: DISCONTINUED | OUTPATIENT
Start: 2023-05-26 | End: 2023-05-26 | Stop reason: HOSPADM

## 2023-05-26 RX ADMIN — ROCURONIUM BROMIDE 10 MG: 10 INJECTION, SOLUTION INTRAVENOUS at 14:23

## 2023-05-26 RX ADMIN — MIDAZOLAM 2 MG: 1 INJECTION INTRAMUSCULAR; INTRAVENOUS at 13:19

## 2023-05-26 RX ADMIN — ROCURONIUM BROMIDE 50 MG: 10 INJECTION, SOLUTION INTRAVENOUS at 13:22

## 2023-05-26 RX ADMIN — FENTANYL CITRATE 25 MCG: 50 INJECTION, SOLUTION INTRAMUSCULAR; INTRAVENOUS at 15:21

## 2023-05-26 RX ADMIN — CEFAZOLIN SODIUM 1000 MG: 1 SOLUTION INTRAVENOUS at 13:42

## 2023-05-26 RX ADMIN — GLYCOPYRROLATE 0.4 MG: 0.4 INJECTION INTRAMUSCULAR; INTRAVENOUS at 15:41

## 2023-05-26 RX ADMIN — SODIUM CHLORIDE, SODIUM LACTATE, POTASSIUM CHLORIDE, AND CALCIUM CHLORIDE: .6; .31; .03; .02 INJECTION, SOLUTION INTRAVENOUS at 14:02

## 2023-05-26 RX ADMIN — ONDANSETRON 4 MG: 4 TABLET, ORALLY DISINTEGRATING ORAL at 09:46

## 2023-05-26 RX ADMIN — CEFAZOLIN SODIUM 1000 MG: 1 SOLUTION INTRAVENOUS at 13:32

## 2023-05-26 RX ADMIN — FENTANYL CITRATE 25 MCG: 50 INJECTION, SOLUTION INTRAMUSCULAR; INTRAVENOUS at 13:22

## 2023-05-26 RX ADMIN — OXYCODONE HYDROCHLORIDE 5 MG: 5 TABLET ORAL at 17:06

## 2023-05-26 RX ADMIN — GABAPENTIN 300 MG: 300 CAPSULE ORAL at 09:45

## 2023-05-26 RX ADMIN — ONDANSETRON 4 MG: 2 INJECTION INTRAMUSCULAR; INTRAVENOUS at 18:11

## 2023-05-26 RX ADMIN — PROPOFOL 80 MCG/KG/MIN: 10 INJECTION, EMULSION INTRAVENOUS at 15:16

## 2023-05-26 RX ADMIN — FENTANYL CITRATE 25 MCG: 50 INJECTION, SOLUTION INTRAMUSCULAR; INTRAVENOUS at 15:46

## 2023-05-26 RX ADMIN — DEXAMETHASONE SODIUM PHOSPHATE 10 MG: 10 INJECTION, SOLUTION INTRAMUSCULAR; INTRAVENOUS at 13:34

## 2023-05-26 RX ADMIN — ACETAMINOPHEN 650 MG: 325 TABLET ORAL at 09:45

## 2023-05-26 RX ADMIN — SODIUM CHLORIDE, SODIUM LACTATE, POTASSIUM CHLORIDE, AND CALCIUM CHLORIDE: .6; .31; .03; .02 INJECTION, SOLUTION INTRAVENOUS at 12:13

## 2023-05-26 RX ADMIN — NEOSTIGMINE METHYLSULFATE 3 MG: 1 INJECTION INTRAVENOUS at 15:41

## 2023-05-26 RX ADMIN — FENTANYL CITRATE 25 MCG: 50 INJECTION, SOLUTION INTRAMUSCULAR; INTRAVENOUS at 15:51

## 2023-05-26 RX ADMIN — PROPOFOL 200 MG: 10 INJECTION, EMULSION INTRAVENOUS at 13:22

## 2023-05-26 RX ADMIN — ONDANSETRON 4 MG: 2 INJECTION INTRAMUSCULAR; INTRAVENOUS at 13:34

## 2023-05-26 NOTE — ANESTHESIA POSTPROCEDURE EVALUATION
Post-Op Assessment Note    CV Status:  Stable  Pain Score: 0    Pain management: adequate     Mental Status:  Sleepy and arousable   Hydration Status:  Euvolemic and stable   PONV Controlled:  Controlled   Airway Patency:  Patent      Post Op Vitals Reviewed: Yes      Staff: CRNA, Anesthesiologist   Comments: Report given to recovering RN, VSS, Pt resting comfortably        No notable events documented      /83 (05/26/23 1556)    Temp 98 °F (36 7 °C) (05/26/23 1556)    Pulse 98 (05/26/23 1556)   Resp 20 (05/26/23 1556)    SpO2 100 % (05/26/23 1556)

## 2023-05-26 NOTE — DISCHARGE INSTR - AVS FIRST PAGE
[address], [address] [facility], [address] [facility], [facility], [address] [facility] /G / [employer]       No strenuous activity 1 week    Apply gauze to incision sites daily    No ice or heating pack    Wear your binder/garment for 3 weeks    Drainage from the incision sites is normal    Avoid aspirin/motrin/advil/alleve for 1 week    No smoking    It is ok to shower    Swelling and bruising is normal    Call [phone] for an appointment in 10-14 days

## 2023-05-26 NOTE — INTERVAL H&P NOTE
H&P reviewed  After examining the patient I find no changes in the patients condition since the H&P had been written      Vitals:    05/26/23 0938   BP: 125/78   Pulse: 69   Resp: 18   Temp: 99 °F (37 2 °C)   SpO2: 96%

## 2023-05-26 NOTE — DISCHARGE SUMMARY
Discharge Summary - Medical Rise Chew 65 y o  female MRN: [id number]    [address] [facility] Room / Bed: OR POOL/OR POOL Encounter: [id number]    BRIEF OVERVIEW  Admitting Provider: Geovanna Parekh MD  Discharge Provider: Geovanna Parekh MD  Primary Care Physician at Discharge: Dennise Bosworth, [address] [place] [address]    Discharge To: Home      Admission Date: 5/26/2023     Discharge Date: No discharge date for patient encounter  Code Status: No Order  Advance Directive and Living Will: <no information>  Power of :        Primary Discharge Diagnosis  Active Problems:    * No active hospital problems  *  Resolved Problems:    * No resolved hospital problems   *        Discharge Disposition: [address] [place] [address]    Presenting Problem/History of Present Illness  <principal problem not specified>      Discharge Condition: stable    Patient tolerated the procedure well, recovered and was discharged home in stable condition    Nick Vargas MD  5/26/2023  1:16 PM

## 2023-05-26 NOTE — OP NOTE
OPERATIVE REPORT  PATIENT NAME: Sharon Side    :  1965  MRN: [id number]  Pt Location:  OR ROOM 08    SURGERY DATE: 2023    Surgeon(s) and Role:     * Milly Comer MD - Primary    Preop Diagnosis:  Lipodystrophy, not elsewhere classified [E88 1]    Post-Op Diagnosis Codes:     * Lipodystrophy, not elsewhere classified [E88 1]    Procedure(s):  LIPOSUCTION ABDOMEN/FLANKS    Specimen(s):  * No specimens in log *    Estimated Blood Loss:   Minimal    Drains:  * No LDAs found *    Anesthesia Type:   General    Operative Indications:  Lipodystrophy, not elsewhere classified [E88 1]      Operative Findings:      Complications:   None    Procedure and Technique:  The patient was marked while standing prior to surgery  The patient was brought to the operating room and placed supine on the operating room table  Time out procedure was performed, SCDs were applied and IV antibiotics were given  After adequate anesthesia was obtained by the anesthesia staff, all appropriate pressure precautions were taken and the patient was placed in a prone position  The posterior flanks and back were prepped and draped using standard surgical technique  Stab incisions were placed in the flanks  Tumescent anesthesia was placed subcutaneously  After adequate time passed for hemostatic effect, liposuction was performed of the flanks leaving 1cm of fat with the skin  The lipo aspirate was a mendes yellow color with minimal blood component  Excellent symmetry was obtained  The stab incisions were closed using 4-0 PDS suture in interrupted deep dermal technique  The wounds were cleaned and dried, skin glue was applied  The patient was the turned to a supine position  The abdomen and flanks were prepped and draped using standard surgical technique  Stab incisions were placed in the supraumbilical and lower flanks  Tumescent anesthesia was placed in a subcutaneous place   After adequate time passed for hemostatic effect, liposuction was performed of the abdomen and flanks leaving 1cm of fat with the skin  The lipo aspirate was a mendes yellow color with minimal blood component  Excellent symmetry was obtained  4900 ml of total lipo aspirate was suctioned  The stab incisions were closed using 4-0 PDS suture in interrupted deep dermal technique  The wounds were cleaned and dried, skin glue was applied  Sterile gauze and an abdominal binder were placed  The patient tolerated the procedure well and was taken to the recovery room in stable condition  I was present for the entire procedure  and A qualified resident physician was not available      Patient Disposition:  hemodynamically stable and extubated and stable        SIGNATURE: Pratibha Webster MD  DATE: May 26, 2023  TIME: 1:15 PM

## 2023-05-26 NOTE — ANESTHESIA PREPROCEDURE EVALUATION
"Procedure:  LIPOSUCTION ABDOMEN/FLANKS (Abdomen)    Relevant Problems   CARDIO   (+) Breast pain      GI/HEPATIC   (+) Gastroesophageal reflux disease without esophagitis      GYN   (+) Malignant neoplasm of female breast (HCC)      PULMONARY   (+) Mild intermittent asthma   (+) Mild intermittent asthma with (acute) exacerbation      Other   (+) Dyslipidemia        Physical Exam    Airway    Mallampati score: II  TM Distance: >3 FB  Neck ROM: full     Dental       Cardiovascular  Cardiovascular exam normal    Pulmonary  Pulmonary exam normal     Other Findings        Anesthesia Plan  ASA Score- 2     Anesthesia Type- general with ASA Monitors  Additional Monitors:   Airway Plan: ETT  Plan Factors-Exercise tolerance (METS): >4 METS  Chart reviewed  Existing labs reviewed  Patient summary reviewed  Patient is not a current smoker  Patient not instructed to abstain from smoking on day of procedure  Patient did not smoke on day of surgery  Induction- intravenous  Postoperative Plan- Plan for postoperative opioid use  Informed Consent- Anesthetic plan and risks discussed with patient and spouse  I personally reviewed this patient with the CRNA  Discussed and agreed on the Anesthesia Plan with the CRNA  Avinash Leon           No results found for: \"HGBA1C\"    Lab Results   Component Value Date    ALKPHOS 61 12/07/2021    ALT 13 12/07/2021    AST 19 12/07/2021    BUN 14 04/21/2023    CALCIUM 9 9 04/21/2023     04/21/2023    CO2 26 04/21/2023    CREATININE 0 86 04/21/2023    EGFR 75 04/21/2023    GLUF 94 12/07/2021    K 3 7 04/21/2023       Lab Results   Component Value Date    HCT 43 0 04/21/2023    HGB 13 9 04/21/2023    MCV 95 04/21/2023     04/21/2023    WBC 5 26 04/21/2023   Normal sinus rhythm  Normal ECG  No previous ECGs available  Confirmed by Valentina Priest ([id number]) on 3/14/2023 2:47:07 PM  "

## 2023-07-25 ENCOUNTER — CLINICAL SUPPORT (OUTPATIENT)
Dept: FAMILY MEDICINE CLINIC | Facility: CLINIC | Age: 58
End: 2023-07-25
Payer: COMMERCIAL

## 2023-07-25 DIAGNOSIS — Z11.1 ENCOUNTER FOR PPD TEST: Primary | ICD-10-CM

## 2023-07-25 PROCEDURE — 86580 TB INTRADERMAL TEST: CPT

## 2023-07-27 LAB
INDURATION: 0 MM
TB SKIN TEST: NEGATIVE

## 2023-10-02 ENCOUNTER — HOSPITAL ENCOUNTER (EMERGENCY)
Facility: HOSPITAL | Age: 58
Discharge: HOME | End: 2023-10-02
Attending: EMERGENCY MEDICINE
Payer: COMMERCIAL

## 2023-10-02 ENCOUNTER — OFFICE VISIT (OUTPATIENT)
Dept: URGENT CARE | Facility: MEDICAL CENTER | Age: 58
End: 2023-10-02
Payer: COMMERCIAL

## 2023-10-02 VITALS
HEIGHT: 63 IN | TEMPERATURE: 100 F | BODY MASS INDEX: 27.3 KG/M2 | HEART RATE: 99 BPM | RESPIRATION RATE: 16 BRPM | DIASTOLIC BLOOD PRESSURE: 75 MMHG | WEIGHT: 154.1 LBS | SYSTOLIC BLOOD PRESSURE: 116 MMHG | OXYGEN SATURATION: 98 %

## 2023-10-02 VITALS
RESPIRATION RATE: 18 BRPM | DIASTOLIC BLOOD PRESSURE: 58 MMHG | WEIGHT: 154 LBS | HEART RATE: 102 BPM | TEMPERATURE: 98.7 F | OXYGEN SATURATION: 97 % | SYSTOLIC BLOOD PRESSURE: 128 MMHG | BODY MASS INDEX: 27.28 KG/M2

## 2023-10-02 DIAGNOSIS — R19.7 NAUSEA VOMITING AND DIARRHEA: Primary | ICD-10-CM

## 2023-10-02 DIAGNOSIS — B34.9 VIRAL SYNDROME: ICD-10-CM

## 2023-10-02 DIAGNOSIS — R05.1 ACUTE COUGH: ICD-10-CM

## 2023-10-02 DIAGNOSIS — R11.2 NAUSEA VOMITING AND DIARRHEA: Primary | ICD-10-CM

## 2023-10-02 DIAGNOSIS — R53.1 WEAKNESS: Primary | ICD-10-CM

## 2023-10-02 LAB
GLUCOSE SERPL-MCNC: 157 MG/DL (ref 65–140)
SARS-COV-2 AG UPPER RESP QL IA: NEGATIVE
SL AMB POCT GLUCOSE BLD: 157
VALID CONTROL: NORMAL

## 2023-10-02 PROCEDURE — 99284 EMERGENCY DEPT VISIT MOD MDM: CPT | Performed by: EMERGENCY MEDICINE

## 2023-10-02 PROCEDURE — 99212 OFFICE O/P EST SF 10 MIN: CPT

## 2023-10-02 PROCEDURE — 82948 REAGENT STRIP/BLOOD GLUCOSE: CPT

## 2023-10-02 PROCEDURE — 99284 EMERGENCY DEPT VISIT MOD MDM: CPT

## 2023-10-02 PROCEDURE — 87811 SARS-COV-2 COVID19 W/OPTIC: CPT

## 2023-10-02 PROCEDURE — 87636 SARSCOV2 & INF A&B AMP PRB: CPT

## 2023-10-02 NOTE — LETTER
[facility] [place]  [address] [place] [address]  No information on file. October 2, 2023    Patient: Celi Mcdonough  YOB: 1965    Celi Mcdonough was seen and evaluated at our [facility]. Please note if Covid and Flu tests are negative, they may return to work when fever free for 24 hours without the use of a fever reducing agent. If Covid or Flu test is positive, they may return to work on 10/5/2023, as this is 5 days from the onset of symptoms. Upon return, they must then adhere to strict masking for an additional 5 days.     Sincerely,      Zehra Burroughs, FEI    Testing Pending as of 10/2/2023

## 2023-10-02 NOTE — PATIENT INSTRUCTIONS
You may take over the counter Tylenol (Acetaminophen) and/or Motrin (Ibuprofen) as needed, as directed on packaging. Be sure to get plenty of rest, and drinking fluids to remain hydrated. Please follow up with your primary provider in the next several days. Should you have any worsening of symptoms, or lack of improvement please be re-evaluated. If needed for significant concerns, consider 911 or ER evaluation. BRAT Diet  This is a guide to follow when choosing foods during a stomach illness. Eating bland foods like these for 24-48 hours will help get some sustenance in your stomach without likely irritating your stomach. Of course, if you are allergic, or unable to tolerate some of these foods you should avoid them, as this is only a guide. These type foods do not have a high nutrient value but are typically gentle on the stomach. BRAT stands for Tiny Post, Apples/Apple Sauce and Toast.   However, these are not the only foods you can eat while following a BRAT diet. You may also eat: Dry Cereal, Saltine Crackers, Oatmeal, Plain Potatoes, & Broth soups. Things you should AVOID: Dairy Products, Sugars, Fried or [employer], Alcohol and caffeine. You should always make sure you are also drinking plenty of fluids including: Water, Broth, Sports drinks, & diluted fruit juice. The unnecessary use of antibiotics can have harmful affect, unwanted side-effects and can lead to antibiotic resistant bacteria in the future. You are being treated today for a viral illness. Viral illnesses do not require antibiotics, and are treated symptomatically. According to the Centers for Disease Control and Prevention, about one-third of antibiotic use in the outpatient setting, is not needed nor appropriate. Antibiotics treat infections caused by bacteria. But they don't treat infections caused by viruses (viral infections).  For example, an antibiotic is the correct treatment for strep throat, which is caused by bacteria. But it's not the right treatment for most sore throats, which are caused by viruses. By being proactive and treating your individual symptoms, this may help you feel better. You may have had testing done today which when completed and resulted may change the course of your treatment. It is at that time that if a change in your treatment is necessary that you will hear from our office. I would also recommend you follow up with your primary care provider in the next few days.

## 2023-10-02 NOTE — Clinical Note
Danis Sousa was seen and treated in our emergency department on 10/2/2023. Diagnosis:     Radha Lynn  may return to work on return date. She may return on this date: 10/05/2023    You may return to work once fever free for 24 hours and symptoms are improving. If you have any questions or concerns, please don't hesitate to call.       Ori Hughes MD    ______________________________           _______________          _______________  Hospital Representative                              Date                                Time

## 2023-10-02 NOTE — PROGRESS NOTES
[place] [facility]        NAME: Shruti Solorzano is a 65 y.o. female  : 1965    MRN: [id number]  DATE: 2023  TIME: 10:52 PM    Assessment and Plan   Nausea vomiting and diarrhea [R11.2, R19.7]  1. Nausea vomiting and diarrhea  Covid/Flu-Office Collect    POCT blood glucose      2. Acute cough  Poct Covid 19 Rapid Antigen Test    Covid/Flu-Office Collect    POCT blood glucose            Patient Instructions       Follow up with PCP in 3-5 days. Proceed to  ER if symptoms worsen. Chief Complaint     Chief Complaint   Patient presents with   • Headache     9/10 pain has been taking Nyquil, robitussin, chloraseptic   • Fatigue   • Generalized Body Aches   • Vomiting     Vomited x 3   • Cough   • Cold Like Symptoms     All SX started on Friday   • Diarrhea     X 2 liquid stools         History of Present Illness       Patient started Friday with Sore throat/post nasal drip. Started taking dayquil/nyquil. Yesterday and today symptoms worsened. She has not had any fevers that she is aware of but has had the chills. She has been eating and drinking. Diarrhea this AM liquid stools x3 episodes today. She has also had some vomiting. She states when she has the diarrhea she gets nauseated and vomits. x3 episodes of emesis today. Denies any blood in her stools. Today she has taken robitussin and last night around midnight she took zyrtec. She has been using chloraceptic spray for the sore throat frequently. She has also had sneezing. Her cough is non-productive. She does appear weak and ill. Patient feels weak and unable to drive home. Offered to call EMS for transports to hospital for evaluation. She feels she is unable to drive home and requested her  be called. I have spoke to her  who will be here in approx 30 minutes to pick her up. We have given her apple juice and yoanna crackers and allowed her to lay down in exam room and rest while waiting for him.        Review of Systems Review of Systems   Constitutional: Positive for chills. Negative for appetite change, fatigue and fever. HENT: Positive for congestion, postnasal drip, rhinorrhea, sinus pressure, sinus pain, sneezing, sore throat and trouble swallowing. Negative for ear pain. Eyes: Negative for visual disturbance. Respiratory: Positive for cough. Negative for shortness of breath. Cardiovascular: Negative for chest pain and palpitations. Gastrointestinal: Positive for diarrhea, nausea and vomiting. Negative for abdominal pain. Abdominal cramping prior to moving bowels. Musculoskeletal: Negative for arthralgias and back pain. Skin: Negative for color change and rash. Neurological: Positive for headaches. All other systems reviewed and are negative.         Current Medications       Current Outpatient Medications:   •  albuterol (PROVENTIL HFA,VENTOLIN HFA) 90 mcg/act inhaler, inhale 2 puffs by mouth and INTO THE LUNGS every 4 hours if needed for wheezing, Disp: 18 g, Rfl: 1  •  fluticasone (FLONASE) 50 mcg/act nasal spray, 1 spray into each nostril daily, Disp: 11.1 mL, Rfl: 0    Current Allergies     Allergies as of 10/02/2023   • (No Known Allergies)            The following portions of the patient's history were reviewed and updated as appropriate: allergies, current medications, past family history, past medical history, past social history, past surgical history and problem list.     Past Medical History:   Diagnosis Date   • Anxiety    • Breast cancer ([address] [facility])    • Cancer ([address] [facility])     breast   • COVID-19    • Depression    • History of chemotherapy    • Kidney stone        Past Surgical History:   Procedure Laterality Date   • AUGMENTATION MAMMAPLASTY     • BREAST SURGERY Bilateral    • LIPOSUCTION     • MASTECTOMY     • CT SUCTION ASSISTED LIPECTOMY TRUNK N/A 05/26/2023    Procedure: LIPOSUCTION ABDOMEN/FLANKS;  Surgeon: Adi Hazel MD;  Location: [address] [place] [address] OR;  Service: Plastics       Family History Problem Relation Age of Onset   • Asthma Mother    • Diabetes Father    • Hypertension Father    • Alzheimer's disease Father          Medications have been verified. Objective   /58   Pulse 102   Temp 98.7 °F (37.1 °C)   Resp 18   Wt 69.9 kg (154 lb)   SpO2 97%   BMI 27.28 kg/m²        Physical Exam     Physical Exam  Vitals and nursing note reviewed. Constitutional:       General: She is not in acute distress. Appearance: Normal appearance. She is well-developed and normal weight. She is ill-appearing. HENT:      Head: Normocephalic and atraumatic. Jaw: There is normal jaw occlusion. Right Ear: Ear canal and external ear normal. There is impacted cerumen. Left Ear: Tympanic membrane, ear canal and external ear normal.      Ears:      Comments: Right TM obscured by soft wax. Nose: Congestion and rhinorrhea present. Rhinorrhea is clear. Mouth/Throat:      Lips: Pink. Mouth: Mucous membranes are moist.      Pharynx: Oropharynx is clear. Uvula midline. No pharyngeal swelling, oropharyngeal exudate, posterior oropharyngeal erythema or uvula swelling. Tonsils: No tonsillar exudate or tonsillar abscesses. 0 on the right. 0 on the left. Eyes:      General: Lids are normal.      Extraocular Movements: Extraocular movements intact. Conjunctiva/sclera: Conjunctivae normal.      Pupils: Pupils are equal, round, and reactive to light. Cardiovascular:      Rate and Rhythm: Regular rhythm. Tachycardia present. Pulses: Normal pulses. Heart sounds: Normal heart sounds, S1 normal and S2 normal. No murmur heard. Comments: Slight tachycardia. Pulmonary:      Effort: Pulmonary effort is normal.      Breath sounds: Normal breath sounds. No decreased breath sounds, wheezing or rhonchi. Abdominal:      General: Abdomen is flat. Bowel sounds are normal.      Palpations: Abdomen is soft. Tenderness: There is no abdominal tenderness. Comments: Generalized discomfort in the abdomen without tenderness. Musculoskeletal:         General: Normal range of motion. Cervical back: Full passive range of motion without pain, normal range of motion and neck supple. Lymphadenopathy:      Cervical: No cervical adenopathy. Skin:     General: Skin is warm and dry. Capillary Refill: Capillary refill takes less than 2 seconds. Neurological:      General: No focal deficit present. Mental Status: She is alert and oriented to person, place, and time. Psychiatric:         Mood and Affect: Mood normal.         Behavior: Behavior normal. Behavior is cooperative.

## 2023-10-02 NOTE — DISCHARGE INSTRUCTIONS
Please continue symptomatic care with Motrin and Tylenol for body aches and fevers. Please try to keep up with fluids for hydration. You may take Imodium for diarrhea if having multiple episodes of diarrhea per day.

## 2023-10-02 NOTE — Clinical Note
Crismichi Hahn was seen and treated in our emergency department on 10/2/2023. Diagnosis:     Major Rankin  may return to work on return date. She may return on this date: 10/05/2023    You may return to work once fever free for 24 hours and symptoms are improving. If you have any questions or concerns, please don't hesitate to call.       Nikia Brown MD    ______________________________           _______________          _______________  Hospital Representative                              Date                                Time

## 2023-10-03 ENCOUNTER — VBI (OUTPATIENT)
Dept: FAMILY MEDICINE CLINIC | Facility: CLINIC | Age: 58
End: 2023-10-03

## 2023-10-03 LAB
FLUAV RNA RESP QL NAA+PROBE: NEGATIVE
FLUBV RNA RESP QL NAA+PROBE: NEGATIVE
SARS-COV-2 RNA RESP QL NAA+PROBE: NEGATIVE

## 2023-10-03 NOTE — LETTER
[facility]  [address]  EDMUND [address]  [address] [facility] [id number] [phone]    Date: 10/04/23    Yumiko Cooper  [address]  [address] [facility]    Dear Reji Rivera: Thank you for choosing [facility] emergency department for care. Your primary care provider wants to make sure that your ongoing medical care is being addressed. If you require follow up care as a result of your emergency department visit, there are a few things the practice would like you to know. As part of the network's continuing commitment to caring for our patients, we have added more same day appointments and have extended office hours to meet your medical needs. After hours, on-call physicians are available via your primary care provider's main office line. We encourage you to contact our office prior to seeking treatment to discuss your symptoms with the medical staff. Together, we can determine the correct course of action. A majority of non-emergent conditions such as: common cold, flu-like symptoms, fevers, strains/sprains, dislocations, minor burns, cuts and animal bites can be treated at [facility]. Diagnostic testing is available at some sites. Of course, if you are experiencing a life threatening medical emergency call 911 or proceed directly to the nearest emergency room.     Your nearest [facility] facility is conveniently located at:    [facility]  [address] [facility], [address]  [phone]  [address] offered at most [facility] Now locations  [facility] your spot online at [email or web address] or on the [address] [place] [address] Drive    Sincerely,    [facility]  Dept: [phone]

## 2023-10-03 NOTE — TELEPHONE ENCOUNTER
10/03/23 3:47 PM    Patient contacted post ED visit, second outreach attempt made. Message was left for patient to return a call to the VBI Department at [facility]: Phone [phone]. Thank you.   Zeynep Clayton, MA  [facility]

## 2023-10-03 NOTE — TELEPHONE ENCOUNTER
10/03/23 11:37 AM    Patient contacted post ED visit, first outreach attempt made. Message was left for patient to return a call to the VBI Department at [facility]: Phone [phone]. Thank you.   Júnior Mathur MA  [facility]

## 2023-10-04 NOTE — TELEPHONE ENCOUNTER
10/04/23 12:23 PM    Patient contacted post ED visit, third outreach attempt made. Message was left for patient to return a call to either the VBI Department at [facility]: Phone [phone] or the PCP office. Thank you.   Ramirez Burton MA  [facility]

## 2023-10-04 NOTE — TELEPHONE ENCOUNTER
10/04/23 12:24 PM    Patient contacted post ED visit, phone outreaches were unsuccessful and a MyChart letter has been sent to the patient as follow-up. Thank you.   Bee Lyle MA  [facility]

## 2023-10-05 NOTE — ED PROVIDER NOTES
History  Chief Complaint   Patient presents with   • Weakness - Generalized     Weakness, sneezing, coughing, headache, and diarrhea since Friday     HPI    20-year-old female who presents for evaluation of generalized weakness and upper respiratory symptoms. Patient states she has been having symptoms for the past 3 to 4 days. She states she has had generalized weakness, coughing, sore throat, and headache. She has also been having diarrhea and nausea/vomiting. Denies abdominal pain. Denies chest pain or shortness of breath. Patient has been taking over-the-counter medications without improvement in symptoms. Patient was seen at urgent care earlier and had COVID and flu test performed which is still in process. 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 Prior to Admission Medications   Prescriptions Last Dose Informant Patient Reported? Taking? albuterol (PROVENTIL HFA,VENTOLIN HFA) 90 mcg/act inhaler   No No   Sig: inhale 2 puffs by mouth and INTO THE LUNGS every 4 hours if needed for wheezing   fluticasone (FLONASE) 50 mcg/act nasal spray   No No   Si spray into each nostril daily      Facility-Administered Medications: None       Past Medical History:   Diagnosis Date   • Anxiety    • Breast cancer ([address])    • Cancer ([address])     breast   • COVID-19    • Depression    • History of chemotherapy    • Kidney stone        Past Surgical History:   Procedure Laterality Date   • AUGMENTATION MAMMAPLASTY     • BREAST SURGERY Bilateral    • LIPOSUCTION     • MASTECTOMY     • AL SUCTION ASSISTED LIPECTOMY TRUNK N/A 2023    Procedure: LIPOSUCTION ABDOMEN/FLANKS;  Surgeon: Zenia Callejas MD;  Location: [address] [place] [address];  Service: Plastics       Family History   Problem Relation Age of Onset   • Asthma Mother    • Diabetes Father    • Hypertension Father    • Alzheimer's disease Father      I have reviewed and agree with the history as documented.     E-Cigarette/Vaping   • E-Cigarette Use Never User      E-Cigarette/Vaping Substances   • Nicotine No    • THC No    • CBD No    • Flavoring No    • Other No    • Unknown No      Social History     Tobacco Use   • Smoking status: Never     Passive exposure: Never   • Smokeless tobacco: Never   Vaping Use   • Vaping Use: Never used   Substance Use Topics   • Alcohol use: Yes     Comment: 2-3 x year   • Drug use: Never       Review of Systems   Constitutional: Positive for appetite change, fatigue and fever. Negative for chills. HENT: Positive for congestion, rhinorrhea and sore throat. Respiratory: Positive for cough. Negative for shortness of breath. Cardiovascular: Negative for chest pain. Gastrointestinal: Positive for diarrhea, nausea and vomiting. Negative for abdominal pain and constipation. Genitourinary: Negative for dysuria, frequency, hematuria and urgency. Musculoskeletal: Positive for myalgias. Negative for arthralgias. Skin: Negative for rash. Neurological: Positive for headaches. Negative for dizziness, weakness, light-headedness and numbness. All other systems reviewed and are negative. Physical Exam  Physical Exam  Vitals and nursing note reviewed. Constitutional:       General: She is not in acute distress. Appearance: Normal appearance. She is well-developed and normal weight. She is not ill-appearing, toxic-appearing or diaphoretic. HENT:      Head: Normocephalic and atraumatic. Right Ear: External ear normal.      Left Ear: External ear normal.      Nose: Nose normal.      Mouth/Throat:      Mouth: Mucous membranes are moist.      Pharynx: Oropharynx is clear. Eyes:      Extraocular Movements: Extraocular movements intact. Conjunctiva/sclera: Conjunctivae normal.   Cardiovascular:      Rate and Rhythm: Normal rate and regular rhythm. Pulses: Normal pulses. Heart sounds: Normal heart sounds. No murmur heard. No friction rub. No gallop.    Pulmonary:      Effort: Pulmonary effort is normal. No respiratory distress. Breath sounds: Normal breath sounds. No wheezing or rales. Abdominal:      General: There is no distension. Palpations: Abdomen is soft. Tenderness: There is no abdominal tenderness. There is no guarding or rebound. Musculoskeletal:         General: No tenderness. Cervical back: Neck supple. Right lower leg: No edema. Left lower leg: No edema. Skin:     General: Skin is warm and dry. Coloration: Skin is not pale. Findings: No erythema or rash. Neurological:      General: No focal deficit present. Mental Status: She is alert and oriented to person, place, and time. Cranial Nerves: No cranial nerve deficit. Sensory: No sensory deficit. Motor: No weakness. Psychiatric:         Mood and Affect: Mood normal.         Behavior: Behavior normal.         Vital Signs  ED Triage Vitals [10/02/23 1242]   Temperature Pulse Respirations Blood Pressure SpO2   100 °F (37.8 °C) 99 16 116/75 98 %      Temp Source Heart Rate Source Patient Position - Orthostatic VS BP Location FiO2 (%)   Temporal Monitor Sitting Left arm --      Pain Score       8           Vitals:    10/02/23 1242   BP: 116/75   Pulse: 99   Patient Position - Orthostatic VS: Sitting         Visual Acuity      ED Medications  Medications - No data to display    Diagnostic Studies  Results Reviewed     None                 No orders to display              Procedures  Procedures         ED Course                               SBIRT 22yo+    Flowsheet Row Most Recent Value   Initial Alcohol Screen: US AUDIT-C     1. How often do you have a drink containing alcohol? 0 Filed at: 10/02/2023 1244   2. How many drinks containing alcohol do you have on a typical day you are drinking? 0 Filed at: 10/02/2023 1244   3a. Male UNDER 65: How often do you have five or more drinks on one occasion? 0 Filed at: 10/02/2023 1244   3b. FEMALE Any Age, or MALE 65+:  How often do you have 4 or more drinks on one occassion? 0 Filed at: 10/02/2023 1244   Audit-C Score 0 Filed at: 10/02/2023 1244   DEVIN: How many times in the past year have you. .. Used an illegal drug or used a prescription medication for non-medical reasons? Never Filed at: 10/02/2023 1244                    [facility]     50-year-old female who presents for evaluation of generalized weakness and upper respiratory symptoms for the past 3 to 4 days, patient is overall well-appearing, vitals normal.  Likely viral syndrome. Patient does not appear clinically dehydrated. Discussed with patient that labs are unlikely to be helpful. Patient is able to tolerate p.o. intake so IV fluids are unlikely to be helpful. Discussed with patient symptomatic treatment. We will have patient follow-up with her primary care doctor. Provided patient a note for work. Discussed strict return precautions. Patient expressed understanding and was agreeable for discharge. Disposition  Final diagnoses:   Weakness   Viral syndrome     Time reflects when diagnosis was documented in both MDM as applicable and the Disposition within this note     Time User Action Codes Description Comment    10/2/2023 12:57 PM Hawa Dahiana Add [R53.1] Weakness     10/2/2023 12:57 PM Hawa Dahiana Add [B34.9] Viral syndrome       ED Disposition     ED Disposition   Discharge    Condition   Stable    Date/Time   Mon Oct 2, 2023 12:57 PM    Comment   Rufina Joshua discharge to home/self care.                Follow-up Information     Follow up With Specialties Details Why Contact Gary Benoit DO Family Medicine Schedule an appointment as soon as possible for a visit   [address] [place] [address] 1  [address] [place] [address]  [phone]            Discharge Medication List as of 10/2/2023  1:02 PM      CONTINUE these medications which have NOT CHANGED    Details   albuterol (PROVENTIL HFA,VENTOLIN HFA) 90 mcg/act inhaler inhale 2 puffs by mouth and INTO THE LUNGS every 4 hours if needed for wheezing, Normal      fluticasone (FLONASE) 50 mcg/act nasal spray 1 spray into each nostril daily, Starting Wed 2/15/2023, Normal             No discharge procedures on file.     PDMP Review     None          ED Provider  Electronically Signed by           Alee Alexandre MD  10/05/23 6301

## 2023-10-10 ENCOUNTER — OFFICE VISIT (OUTPATIENT)
Dept: FAMILY MEDICINE CLINIC | Facility: CLINIC | Age: 58
End: 2023-10-10
Payer: COMMERCIAL

## 2023-10-10 VITALS
DIASTOLIC BLOOD PRESSURE: 80 MMHG | BODY MASS INDEX: 27.23 KG/M2 | WEIGHT: 153.7 LBS | OXYGEN SATURATION: 97 % | HEART RATE: 102 BPM | HEIGHT: 63 IN | TEMPERATURE: 98.2 F | SYSTOLIC BLOOD PRESSURE: 128 MMHG

## 2023-10-10 DIAGNOSIS — R53.83 OTHER FATIGUE: ICD-10-CM

## 2023-10-10 DIAGNOSIS — C50.919 MALIGNANT NEOPLASM OF FEMALE BREAST, UNSPECIFIED ESTROGEN RECEPTOR STATUS, UNSPECIFIED LATERALITY, UNSPECIFIED SITE OF BREAST (HCC): ICD-10-CM

## 2023-10-10 DIAGNOSIS — R73.01 IMPAIRED FASTING GLUCOSE: Primary | ICD-10-CM

## 2023-10-10 DIAGNOSIS — E78.5 DYSLIPIDEMIA: ICD-10-CM

## 2023-10-10 PROCEDURE — 99213 OFFICE O/P EST LOW 20 MIN: CPT | Performed by: FAMILY MEDICINE

## 2023-10-10 RX ORDER — POLYETHYLENE GLYCOL 3350 17 G/17G
17 POWDER, FOR SOLUTION ORAL DAILY
COMMUNITY

## 2023-10-10 RX ORDER — CALCIUM CARBONATE 500 MG/1
2 TABLET, CHEWABLE ORAL AS NEEDED
COMMUNITY

## 2023-10-10 NOTE — PROGRESS NOTES
Name: Shruti Solorzano      : 1965      MRN: [id number]  Encounter Provider: Cynthia Stacy DO  Encounter Date: 10/10/2023   Encounter department: [address] [place] [address]     1. Impaired fasting glucose  Assessment & Plan:  Patient sounds like she has impaired fasting glucose or perhaps glucose intolerance. I last saw her for her annual physical in 2021. At that time, labs were done and her fasting blood glucose was 94. I note that she had labs done for preadmission testing in April of this year. Her blood glucose was elevated at 116 at that time. Tells me she was fasting. She has a very strong family history of diabetes. As such, going to recommend she get routine labs done. I ordered fasting blood sugar and hemoglobin A1c. I will make further recommendations to the patient when I get these labs. She will need to decrease her carbohydrate intake. Orders:  -     Hemoglobin A1C    2. Dyslipidemia  -     Lipid panel; Future    3. Malignant neoplasm of female breast, unspecified estrogen receptor status, unspecified laterality, unspecified site of breast ([address])  -     CBC and differential; Future  -     Comprehensive metabolic panel; Future    4. Other fatigue  -     CBC and differential; Future  -     TSH, 3rd generation with Free T4 reflex; Future         Subjective      Patient is a 23-year-old female who presents to the office today concerned about her blood sugar. She tells me she was sick about 2 weeks ago went to the urgent care center. Patient was experiencing nausea, vomiting, and diarrhea. She was very tired and could not stay awake. They thought that perhaps she was experiencing hypoglycemia. Tells me they did an Accu-Chek and her blood sugar was 157. She tells me they could not find anything wrong with her so she subsequently went to the ER. She made the appointment here today because she is concerned about her blood sugar.   I reviewed her family history. She has a very strong family history of type 2 diabetes. She states that her mother, cousins, maternal grandmother, and maternal grandfather were all diabetic. Review of Systems   Gastrointestinal:        Positive for frequent heartburn. Also positive for chronic constipation. She treats her constipation successfully with Benefiber and MiraLAX daily. Endocrine: Negative for polydipsia, polyphagia and polyuria. Genitourinary:        Reports nocturia       Current Outpatient Medications on File Prior to Visit   Medication Sig   • albuterol (PROVENTIL HFA,VENTOLIN HFA) 90 mcg/act inhaler inhale 2 puffs by mouth and INTO THE LUNGS every 4 hours if needed for wheezing   • calcium carbonate (TUMS) 500 mg chewable tablet Chew 2 tablets if needed   • fluticasone (FLONASE) 50 mcg/act nasal spray 1 spray into each nostril daily   • polyethylene glycol (GLYCOLAX) 17 GM/SCOOP powder Take 17 g by mouth daily   • Wheat Dextrin (BENEFIBER DRINK MIX PO) Take 1 packet by mouth in the morning       Objective     /80 (BP Location: Left arm, Patient Position: Sitting, Cuff Size: Adult)   Pulse 102   Temp 98.2 °F (36.8 °C) (Tympanic)   Ht 5' 3" (1.6 m)   Wt 69.7 kg (153 lb 11.2 oz)   SpO2 97%   BMI 27.23 kg/m²     Physical Exam  Vitals reviewed. Constitutional:       Comments: This patient is a 58-year-old female who appears her stated age she is pleasant, cooperative, and in no distress. HENT:      Head: Normocephalic and atraumatic. Right Ear: Tympanic membrane, ear canal and external ear normal. There is no impacted cerumen. Left Ear: Tympanic membrane, ear canal and external ear normal. There is no impacted cerumen. Mouth/Throat:      Mouth: Mucous membranes are moist.      Pharynx: Oropharynx is clear. No oropharyngeal exudate or posterior oropharyngeal erythema. Eyes:      General: No scleral icterus. Right eye: No discharge. Left eye: No discharge. Conjunctiva/sclera: Conjunctivae normal.      Pupils: Pupils are equal, round, and reactive to light. Cardiovascular:      Rate and Rhythm: Normal rate and regular rhythm. Heart sounds: Normal heart sounds. No murmur heard. No friction rub. No gallop. Pulmonary:      Effort: Pulmonary effort is normal. No respiratory distress. Breath sounds: Normal breath sounds. No stridor. No wheezing, rhonchi or rales. Abdominal:      General: Bowel sounds are normal. There is no distension. Palpations: Abdomen is soft. There is no mass. Tenderness: There is no abdominal tenderness. There is no guarding. Musculoskeletal:      Cervical back: Neck supple. Lymphadenopathy:      Cervical: No cervical adenopathy. Psychiatric:         Mood and Affect: Mood normal.         Behavior: Behavior normal.         Thought Content:  Thought content normal.         Judgment: Judgment normal.       Megan Damon,

## 2023-10-10 NOTE — ASSESSMENT & PLAN NOTE
Patient sounds like she has impaired fasting glucose or perhaps glucose intolerance. I last saw her for her annual physical in December 2021. At that time, labs were done and her fasting blood glucose was 94. I note that she had labs done for preadmission testing in April of this year. Her blood glucose was elevated at 116 at that time. Tells me she was fasting. She has a very strong family history of diabetes. As such, going to recommend she get routine labs done. I ordered fasting blood sugar and hemoglobin A1c. I will make further recommendations to the patient when I get these labs. She will need to decrease her carbohydrate intake.

## 2023-10-12 ENCOUNTER — TELEPHONE (OUTPATIENT)
Dept: ADMINISTRATIVE | Facility: OTHER | Age: 58
End: 2023-10-12

## 2023-10-12 NOTE — LETTER
Procedure Request Form: Colonoscopy  Second Attempt      Date Requested: 10/16/23  Patient: Kike Guzman  Patient : 1965   Referring Provider: Harjinder Nichols, DO        Date of Procedure ______________________________       The above patient has informed us that they have completed their   most recent Colonoscopy at your facility. Please complete   this form and attach all corresponding procedure reports/results. Comments __________________________________________________________  ____________________________________________________________________  ____________________________________________________________________  ____________________________________________________________________    Facility Completing Procedure _________________________________________    Form Completed By (print name) _______________________________________      Signature __________________________________________________________      These reports are needed for  compliance. Please fax this completed form and a copy of the procedure report to our office located at [address] [place] [address] as soon as possible to Fax [phone] attention Anni Chen: Phone [phone]    We thank you for your assistance in treating our mutual patient.

## 2023-10-12 NOTE — TELEPHONE ENCOUNTER
Upon review of the In Basket request and the patient's chart, initial outreach has been made via fax to facility. Please see Contacts section for details.      Thank you  Jyoti Lynn

## 2023-10-12 NOTE — TELEPHONE ENCOUNTER
----- [facility] at 10/11/2023  1:33 PM EDT -----  Regarding: CARE GAP REQUEST  10/11/23 1:33 PM    Hello, our patient No patient name on file. has had CRC: Colonoscopy completed/performed. Please assist in updating the patient chart by making an External outreach to Dr. Altagracia Saeed facility located in [facility]. The date of service is unknown .     Thank you,  [address]

## 2023-10-12 NOTE — LETTER
Procedure Request Form: Colonoscopy      Date Requested: 10/12/23  Patient: [facility]  Patient : 1965   Referring Provider: Michael Oatesl, DO        Date of Procedure ______________________________       The above patient has informed us that they have completed their   most recent Colonoscopy at your facility. Please complete   this form and attach all corresponding procedure reports/results. Comments __________________________________________________________  ____________________________________________________________________  ____________________________________________________________________  ____________________________________________________________________    Facility Completing Procedure _________________________________________    Form Completed By (print name) _______________________________________      Signature __________________________________________________________      These reports are needed for  compliance. Please fax this completed form and a copy of the procedure report to our office located at [address] [place] [address] as soon as possible to Fax [phone] attention Magdiel Cooley: Phone [phone]    We thank you for your assistance in treating our mutual patient.

## 2023-10-13 ENCOUNTER — APPOINTMENT (OUTPATIENT)
Dept: LAB | Facility: CLINIC | Age: 58
End: 2023-10-13
Payer: COMMERCIAL

## 2023-10-13 DIAGNOSIS — C50.919 MALIGNANT NEOPLASM OF FEMALE BREAST, UNSPECIFIED ESTROGEN RECEPTOR STATUS, UNSPECIFIED LATERALITY, UNSPECIFIED SITE OF BREAST (HCC): ICD-10-CM

## 2023-10-13 DIAGNOSIS — E78.5 DYSLIPIDEMIA: ICD-10-CM

## 2023-10-13 DIAGNOSIS — R53.83 OTHER FATIGUE: ICD-10-CM

## 2023-10-13 LAB
ALBUMIN SERPL BCP-MCNC: 4.1 G/DL (ref 3.5–5)
ALP SERPL-CCNC: 53 U/L (ref 34–104)
ALT SERPL W P-5'-P-CCNC: 6 U/L (ref 7–52)
ANION GAP SERPL CALCULATED.3IONS-SCNC: 5 MMOL/L
AST SERPL W P-5'-P-CCNC: 21 U/L (ref 13–39)
BASOPHILS # BLD AUTO: 0.05 THOUSANDS/ÂΜL (ref 0–0.1)
BASOPHILS NFR BLD AUTO: 1 % (ref 0–1)
BILIRUB SERPL-MCNC: 0.68 MG/DL (ref 0.2–1)
BUN SERPL-MCNC: 13 MG/DL (ref 5–25)
CALCIUM SERPL-MCNC: 9.6 MG/DL (ref 8.4–10.2)
CHLORIDE SERPL-SCNC: 106 MMOL/L (ref 96–108)
CHOLEST SERPL-MCNC: 209 MG/DL
CO2 SERPL-SCNC: 28 MMOL/L (ref 21–32)
CREAT SERPL-MCNC: 0.71 MG/DL (ref 0.6–1.3)
EOSINOPHIL # BLD AUTO: 0.28 THOUSAND/ÂΜL (ref 0–0.61)
EOSINOPHIL NFR BLD AUTO: 6 % (ref 0–6)
ERYTHROCYTE [DISTWIDTH] IN BLOOD BY AUTOMATED COUNT: 13.5 % (ref 11.6–15.1)
EST. AVERAGE GLUCOSE BLD GHB EST-MCNC: 137 MG/DL
GFR SERPL CREATININE-BSD FRML MDRD: 95 ML/MIN/1.73SQ M
GLUCOSE P FAST SERPL-MCNC: 91 MG/DL (ref 65–99)
HBA1C MFR BLD: 6.4 %
HCT VFR BLD AUTO: 42.8 % (ref 34.8–46.1)
HDLC SERPL-MCNC: 61 MG/DL
HGB BLD-MCNC: 13.5 G/DL (ref 11.5–15.4)
IMM GRANULOCYTES # BLD AUTO: 0.02 THOUSAND/UL (ref 0–0.2)
IMM GRANULOCYTES NFR BLD AUTO: 1 % (ref 0–2)
LDLC SERPL CALC-MCNC: 135 MG/DL (ref 0–100)
LYMPHOCYTES # BLD AUTO: 1.88 THOUSANDS/ÂΜL (ref 0.6–4.47)
LYMPHOCYTES NFR BLD AUTO: 43 % (ref 14–44)
MCH RBC QN AUTO: 29.2 PG (ref 26.8–34.3)
MCHC RBC AUTO-ENTMCNC: 31.5 G/DL (ref 31.4–37.4)
MCV RBC AUTO: 93 FL (ref 82–98)
MONOCYTES # BLD AUTO: 0.4 THOUSAND/ÂΜL (ref 0.17–1.22)
MONOCYTES NFR BLD AUTO: 9 % (ref 4–12)
NEUTROPHILS # BLD AUTO: 1.77 THOUSANDS/ÂΜL (ref 1.85–7.62)
NEUTS SEG NFR BLD AUTO: 40 % (ref 43–75)
NONHDLC SERPL-MCNC: 148 MG/DL
NRBC BLD AUTO-RTO: 0 /100 WBCS
PLATELET # BLD AUTO: 309 THOUSANDS/UL (ref 149–390)
PMV BLD AUTO: 10.3 FL (ref 8.9–12.7)
POTASSIUM SERPL-SCNC: 4.3 MMOL/L (ref 3.5–5.3)
PROT SERPL-MCNC: 7 G/DL (ref 6.4–8.4)
RBC # BLD AUTO: 4.62 MILLION/UL (ref 3.81–5.12)
SODIUM SERPL-SCNC: 139 MMOL/L (ref 135–147)
TRIGL SERPL-MCNC: 66 MG/DL
TSH SERPL DL<=0.05 MIU/L-ACNC: 2.58 UIU/ML (ref 0.45–4.5)
WBC # BLD AUTO: 4.4 THOUSAND/UL (ref 4.31–10.16)

## 2023-10-13 PROCEDURE — 80061 LIPID PANEL: CPT

## 2023-10-13 PROCEDURE — 85025 COMPLETE CBC W/AUTO DIFF WBC: CPT

## 2023-10-13 PROCEDURE — 83036 HEMOGLOBIN GLYCOSYLATED A1C: CPT | Performed by: FAMILY MEDICINE

## 2023-10-13 PROCEDURE — 36415 COLL VENOUS BLD VENIPUNCTURE: CPT

## 2023-10-13 PROCEDURE — 84443 ASSAY THYROID STIM HORMONE: CPT

## 2023-10-13 PROCEDURE — 80053 COMPREHEN METABOLIC PANEL: CPT

## 2023-10-16 NOTE — TELEPHONE ENCOUNTER
As a follow-up, a second attempt has been made for outreach via fax to facility. Please see Contacts section for details.     Thank you  John Huber

## 2023-10-20 NOTE — TELEPHONE ENCOUNTER
Upon review of the In Basket request we  have noted a release form must be sent to facility. Any additional questions or concerns should be emailed to the Practice Liaisons via the appropriate education email address, please do not reply via In Basket.     Thank you  Hetal Titus

## 2023-10-27 ENCOUNTER — OFFICE VISIT (OUTPATIENT)
Dept: URGENT CARE | Facility: MEDICAL CENTER | Age: 58
End: 2023-10-27
Payer: COMMERCIAL

## 2023-10-27 VITALS
OXYGEN SATURATION: 98 % | HEART RATE: 74 BPM | DIASTOLIC BLOOD PRESSURE: 80 MMHG | WEIGHT: 153 LBS | RESPIRATION RATE: 20 BRPM | BODY MASS INDEX: 27.1 KG/M2 | SYSTOLIC BLOOD PRESSURE: 110 MMHG | TEMPERATURE: 97.9 F

## 2023-10-27 DIAGNOSIS — M79.642 LEFT HAND PAIN: Primary | ICD-10-CM

## 2023-10-27 PROCEDURE — 99212 OFFICE O/P EST SF 10 MIN: CPT

## 2023-10-27 NOTE — PROGRESS NOTES
[facility]        NAME: Robert Montesinos is a 65 y.o. female  : 1965    MRN: [id number]  DATE: 2023  TIME: 12:13 PM    Assessment and Plan   Left hand pain [M79.642]  1. Left hand pain              Patient Instructions       Follow up with PCP in 3-5 days. Proceed to  ER if symptoms worsen. Chief Complaint     Chief Complaint   Patient presents with   • Hand Injury     Left hand pain. Was decorating a window at school and felt pain to left hand. Hand was swollen yesterday and is unable to make a fist and grasp. 5/10 pain. Has been taking Motrin         History of Present Illness       Patient was decorating at school yesterday when she started with left hand pain. Pain radiates into her upper arm. Pain worse with movement, improved with rest. Pain even with holding phone. Pain described as a dull ache. Pain is constant in nature. She has taken advil yesterday for the pain but did not take anything for it today. 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 She was lifting her arm up above her head and doing repetitive motions. Review of Systems   Review of Systems   Constitutional:  Negative for chills, fatigue and fever. Respiratory:  Negative for cough and shortness of breath. Cardiovascular:  Negative for chest pain and palpitations. Gastrointestinal:  Negative for abdominal distention. Musculoskeletal:  Positive for arthralgias. Negative for back pain. Skin:  Negative for color change and rash. Neurological:  Negative for headaches. All other systems reviewed and are negative.         Current Medications       Current Outpatient Medications:   •  albuterol (PROVENTIL HFA,VENTOLIN HFA) 90 mcg/act inhaler, inhale 2 puffs by mouth and INTO THE LUNGS every 4 hours if needed for wheezing, Disp: 18 g, Rfl: 1  •  calcium carbonate (TUMS) 500 mg chewable tablet, Chew 2 tablets if needed, Disp: , Rfl:   •  polyethylene glycol (GLYCOLAX) 17 GM/SCOOP powder, Take 17 g by mouth daily, Disp: , Rfl:   •  Wheat Dextrin (BENEFIBER DRINK MIX PO), Take 1 packet by mouth in the morning, Disp: , Rfl:   •  fluticasone (FLONASE) 50 mcg/act nasal spray, 1 spray into each nostril daily (Patient not taking: Reported on 10/27/2023), Disp: 11.1 mL, Rfl: 0    Current Allergies     Allergies as of 10/27/2023   • (No Known Allergies)            The following portions of the patient's history were reviewed and updated as appropriate: allergies, current medications, past family history, past medical history, past social history, past surgical history and problem list.     Past Medical History:   Diagnosis Date   • Anxiety    • Breast cancer ([address])    • Cancer ([address])     breast   • COVID-19    • Depression    • History of chemotherapy    • Kidney stone    • Pre-diabetes        Past Surgical History:   Procedure Laterality Date   • AUGMENTATION MAMMAPLASTY     • BREAST SURGERY Bilateral    • LIPOSUCTION     • MASTECTOMY     • NE SUCTION ASSISTED LIPECTOMY TRUNK N/A 05/26/2023    Procedure: LIPOSUCTION ABDOMEN/FLANKS;  Surgeon: Ck Santoyo MD;  Location: [address] [place] [address];  Service: Plastics       Family History   Problem Relation Age of Onset   • Asthma Mother    • Diabetes Father    • Hypertension Father    • Alzheimer's disease Father          Medications have been verified. Objective   /80   Pulse 74   Temp 97.9 °F (36.6 °C)   Resp 20   Wt 69.4 kg (153 lb)   SpO2 98%   BMI 27.10 kg/m²        Physical Exam     Physical Exam  Vitals and nursing note reviewed. Constitutional:       General: She is not in acute distress. Appearance: Normal appearance. She is normal weight. She is not ill-appearing. HENT:      Head: Normocephalic and atraumatic. Nose: Nose normal.      Mouth/Throat:      Mouth: Mucous membranes are moist.      Pharynx: Oropharynx is clear. Eyes:      Extraocular Movements: Extraocular movements intact.       Conjunctiva/sclera: Conjunctivae normal.      Pupils: Pupils are equal, round, and reactive to light. Cardiovascular:      Rate and Rhythm: Normal rate and regular rhythm. Pulses: Normal pulses. Heart sounds: Normal heart sounds. Pulmonary:      Effort: Pulmonary effort is normal.      Breath sounds: Normal breath sounds. Abdominal:      General: Abdomen is flat. Bowel sounds are normal.      Palpations: Abdomen is soft. Musculoskeletal:         General: Normal range of motion. Hands:       Cervical back: Normal range of motion and neck supple. Comments: Reports being tender from hand to shoulder. Skin:     General: Skin is warm. Capillary Refill: Capillary refill takes less than 2 seconds. Neurological:      General: No focal deficit present. Mental Status: She is alert and oriented to person, place, and time.    Psychiatric:         Mood and Affect: Mood normal.         Behavior: Behavior normal.

## 2023-10-27 NOTE — LETTER
October 27, 2023     Patient: Daysi Luu   YOB: 1965   Date of Visit: 10/27/2023       To Whom it May Concern:    Daysi Luu was seen in my clinic on 10/27/2023. She may return to work on 10/30/2023 . If you have any questions or concerns, please don't hesitate to call.          Sincerely,          SONAL Hopkins        CC: No Recipients

## 2024-01-10 ENCOUNTER — OFFICE VISIT (OUTPATIENT)
Dept: FAMILY MEDICINE CLINIC | Facility: CLINIC | Age: 59
End: 2024-01-10
Payer: COMMERCIAL

## 2024-01-10 VITALS
HEIGHT: 63 IN | WEIGHT: 156.3 LBS | TEMPERATURE: 97.5 F | SYSTOLIC BLOOD PRESSURE: 113 MMHG | DIASTOLIC BLOOD PRESSURE: 69 MMHG | BODY MASS INDEX: 27.7 KG/M2 | HEART RATE: 87 BPM | OXYGEN SATURATION: 97 %

## 2024-01-10 DIAGNOSIS — J01.90 ACUTE NON-RECURRENT SINUSITIS, UNSPECIFIED LOCATION: ICD-10-CM

## 2024-01-10 DIAGNOSIS — B34.9 VIRAL INFECTION, UNSPECIFIED: Primary | ICD-10-CM

## 2024-01-10 DIAGNOSIS — C50.919 MALIGNANT NEOPLASM OF FEMALE BREAST, UNSPECIFIED ESTROGEN RECEPTOR STATUS, UNSPECIFIED LATERALITY, UNSPECIFIED SITE OF BREAST (HCC): ICD-10-CM

## 2024-01-10 DIAGNOSIS — R73.01 IMPAIRED FASTING GLUCOSE: ICD-10-CM

## 2024-01-10 LAB
SARS-COV-2 AG UPPER RESP QL IA: NEGATIVE
VALID CONTROL: NORMAL

## 2024-01-10 PROCEDURE — 99214 OFFICE O/P EST MOD 30 MIN: CPT | Performed by: FAMILY MEDICINE

## 2024-01-10 PROCEDURE — 87811 SARS-COV-2 COVID19 W/OPTIC: CPT | Performed by: FAMILY MEDICINE

## 2024-01-10 RX ORDER — AMOXICILLIN 875 MG/1
875 TABLET, COATED ORAL 2 TIMES DAILY
Qty: 20 TABLET | Refills: 0 | Status: SHIPPED | OUTPATIENT
Start: 2024-01-10 | End: 2024-01-20

## 2024-01-10 RX ORDER — DEXTROMETHORPHAN HYDROBROMIDE AND PROMETHAZINE HYDROCHLORIDE 15; 6.25 MG/5ML; MG/5ML
5 SYRUP ORAL EVERY 4 HOURS PRN
Qty: 180 ML | Refills: 0 | Status: SHIPPED | OUTPATIENT
Start: 2024-01-10

## 2024-01-10 NOTE — PROGRESS NOTES
Name: Rufina Joshua      : 1965      MRN: [id number]  Encounter Provider: Reji De Guzman DO  Encounter Date: 1/10/2024   Encounter department: [facility]    Assessment & Plan     1. Viral infection, unspecified  Assessment & Plan:  A rapid antigen COVID-19 test was done in the office today.  This was negative.    Orders:  -     Poct Covid 19 Rapid Antigen Test    2. Acute non-recurrent sinusitis, unspecified location  Assessment & Plan:  Patient has an acute sinusitis.  She was started on amoxicillin 875 mg.  She will take 1 tablet twice a day x 10 days.  For symptomatic relief, she was started on Phenergan-DM.  She will take 5 mL every 4 hours as needed for cough, congestion, or rhinorrhea.  She was advised that this can cause drowsiness.  She should not drive after taking this medication.  I would recommend she take it after work or at bedtime.  Patient has been instructed to increase fluid intake and rest.  She may take Tylenol as needed.  Return to the office if no improvement or worsens.    Orders:  -     promethazine-dextromethorphan (PHENERGAN-DM) 6.25-15 mg/5 mL oral syrup; Take 5 mL by mouth every 4 (four) hours as needed for cough  -     amoxicillin (AMOXIL) 875 mg tablet; Take 1 tablet (875 mg total) by mouth 2 (two) times a day for 10 days    3. Impaired fasting glucose  Assessment & Plan:  Explained to the patient her blood test results.  Although her fasting blood sugar was normal, her hemoglobin A1c is elevated.  I explained that this means that her blood sugars have been elevated in general over the past several months.  I also advised the patient that this indicates she does have prediabetes.  Given her family history, the patient is high risk for developing overt diabetes.  I counseled the patient regarding weight loss as well as regular exercise.  I ordered a repeat fasting blood sugar as well as a hemoglobin A1c and asked her to have this done in April.  I would  like to check this every 6 months.    Orders:  -     Hemoglobin A1C; Future; Expected date: 04/01/2024  -     Basic metabolic panel; Future; Expected date: 04/01/2024    4. Malignant neoplasm of female breast, unspecified estrogen receptor status, unspecified laterality, unspecified site of breast (HCC)           Subjective      Patient is a 58-year-old white female who presents to the office today complaining of cough, nasal congestion, sneezing, malaise, and fatigue.  She reports sore throat as well.  She denies fever or chills.  She denies wheezing, chest congestion, and shortness of breath.  Her symptoms began 2 days ago.  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  Patient tells me she has tried NyQuil, Chloraseptic spray, and Zyrtec.  Patient states she has questions regarding her abnormal labs.  She wanted to discuss them today.  I did review her family history with her.  Her maternal grandmother, maternal grandfather, father and mother are all diabetic.  Her sister is alive and has health problems related to a motor vehicle accident.  She is not diabetic.      Review of Systems   Constitutional:  Positive for fatigue. Negative for activity change, appetite change, chills and fever.   HENT:  Positive for congestion, sinus pressure, sinus pain, sneezing and sore throat.    Respiratory:  Positive for cough. Negative for shortness of breath and wheezing.    Cardiovascular:  Negative for chest pain, palpitations and leg swelling.   Gastrointestinal:  Positive for constipation. Negative for abdominal distention, abdominal pain, blood in stool, diarrhea and nausea.       Current Outpatient Medications on File Prior to Visit   Medication Sig    albuterol (PROVENTIL HFA,VENTOLIN HFA) 90 mcg/act inhaler inhale 2 puffs by mouth and INTO THE LUNGS every 4 hours if needed for wheezing    calcium carbonate (TUMS) 500 mg chewable tablet  "Chew 2 tablets if needed    fluticasone (FLONASE) 50 mcg/act nasal spray 1 spray into each nostril daily    polyethylene glycol (GLYCOLAX) 17 GM/SCOOP powder Take 17 g by mouth daily    Wheat Dextrin (BENEFIBER DRINK MIX PO) Take 1 packet by mouth in the morning       Objective     /69   Pulse 87   Temp 97.5 °F (36.4 °C) (Tympanic)   Ht 5' 3\" (1.6 m)   Wt 70.9 kg (156 lb 4.8 oz)   SpO2 97%   BMI 27.69 kg/m²     Physical Exam  Vitals reviewed.   Constitutional:       Comments: He is a 58-year-old  female who appears her stated age.  The patient is nonseptic in appearance and in no apparent distress.   HENT:      Head: Normocephalic.      Comments: There is tenderness noted to palpation over the paranasal sinuses     Right Ear: Tympanic membrane, ear canal and external ear normal. There is no impacted cerumen.      Left Ear: Tympanic membrane, ear canal and external ear normal. There is no impacted cerumen.      Nose: Congestion present.      Mouth/Throat:      Mouth: Mucous membranes are moist.      Pharynx: Oropharynx is clear. No oropharyngeal exudate or posterior oropharyngeal erythema.   Eyes:      General: No scleral icterus.        Right eye: No discharge.         Left eye: No discharge.      Conjunctiva/sclera: Conjunctivae normal.      Pupils: Pupils are equal, round, and reactive to light.   Cardiovascular:      Rate and Rhythm: Normal rate and regular rhythm.      Heart sounds: Normal heart sounds. No murmur heard.     No friction rub. No gallop.   Pulmonary:      Effort: Pulmonary effort is normal. No respiratory distress.      Breath sounds: Normal breath sounds. No stridor. No wheezing, rhonchi or rales.   Musculoskeletal:      Cervical back: Neck supple.   Lymphadenopathy:      Cervical: No cervical adenopathy.   Psychiatric:         Mood and Affect: Mood normal.         Behavior: Behavior normal.         Thought Content: Thought content normal.         Judgment: Judgment normal. "       Reji De Guzman, DO

## 2024-01-11 NOTE — ASSESSMENT & PLAN NOTE
Explained to the patient her blood test results.  Although her fasting blood sugar was normal, her hemoglobin A1c is elevated.  I explained that this means that her blood sugars have been elevated in general over the past several months.  I also advised the patient that this indicates she does have prediabetes.  Given her family history, the patient is high risk for developing overt diabetes.  I counseled the patient regarding weight loss as well as regular exercise.  I ordered a repeat fasting blood sugar as well as a hemoglobin A1c and asked her to have this done in April.  I would like to check this every 6 months.

## 2024-01-11 NOTE — ASSESSMENT & PLAN NOTE
Patient has an acute sinusitis.  She was started on amoxicillin 875 mg.  She will take 1 tablet twice a day x 10 days.  For symptomatic relief, she was started on Phenergan-DM.  She will take 5 mL every 4 hours as needed for cough, congestion, or rhinorrhea.  She was advised that this can cause drowsiness.  She should not drive after taking this medication.  I would recommend she take it after work or at bedtime.  Patient has been instructed to increase fluid intake and rest.  She may take Tylenol as needed.  Return to the office if no improvement or worsens.

## 2024-02-21 PROBLEM — J01.90 ACUTE NON-RECURRENT SINUSITIS: Status: RESOLVED | Noted: 2024-01-10 | Resolved: 2024-02-21

## 2024-02-21 PROBLEM — H10.022 MUCOPURULENT CONJUNCTIVITIS OF LEFT EYE: Status: RESOLVED | Noted: 2019-08-29 | Resolved: 2024-02-21

## 2024-06-07 DIAGNOSIS — J45.20 MILD INTERMITTENT ASTHMA, UNSPECIFIED WHETHER COMPLICATED: ICD-10-CM

## 2024-06-07 RX ORDER — ALBUTEROL SULFATE 90 UG/1
AEROSOL, METERED RESPIRATORY (INHALATION)
Qty: 18 G | Refills: 5 | Status: SHIPPED | OUTPATIENT
Start: 2024-06-07

## 2024-06-07 NOTE — TELEPHONE ENCOUNTER
Medication: albuterol (PROVENTIL HFA,VENTOLIN HFA)     Dose/Frequency: 90 mcg/act inhaler     Quantity: 1    Pharmacy: [id number] - [place] - [address] [place] [address]      Office:   [x] PCP/Provider -   [] Speciality/Provider -     Does the patient have enough for 3 days?   [] Yes   [x] No - Send as HP to POD

## 2024-08-14 ENCOUNTER — OFFICE VISIT (OUTPATIENT)
Dept: URGENT CARE | Facility: MEDICAL CENTER | Age: 59
End: 2024-08-14
Payer: COMMERCIAL

## 2024-08-14 VITALS
DIASTOLIC BLOOD PRESSURE: 80 MMHG | BODY MASS INDEX: 27.82 KG/M2 | HEART RATE: 93 BPM | OXYGEN SATURATION: 98 % | SYSTOLIC BLOOD PRESSURE: 128 MMHG | RESPIRATION RATE: 20 BRPM | HEIGHT: 63 IN | WEIGHT: 157 LBS | TEMPERATURE: 97.1 F

## 2024-08-14 DIAGNOSIS — R19.7 NAUSEA VOMITING AND DIARRHEA: Primary | ICD-10-CM

## 2024-08-14 DIAGNOSIS — R11.2 NAUSEA VOMITING AND DIARRHEA: Primary | ICD-10-CM

## 2024-08-14 DIAGNOSIS — R11.0 NAUSEA: ICD-10-CM

## 2024-08-14 PROCEDURE — 99213 OFFICE O/P EST LOW 20 MIN: CPT

## 2024-08-14 RX ORDER — ONDANSETRON 4 MG/1
4 TABLET, ORALLY DISINTEGRATING ORAL ONCE
Status: COMPLETED | OUTPATIENT
Start: 2024-08-14 | End: 2024-08-14

## 2024-08-14 RX ADMIN — ONDANSETRON 4 MG: 4 TABLET, ORALLY DISINTEGRATING ORAL at 11:39

## 2024-08-14 NOTE — PATIENT INSTRUCTIONS
Please follow up with your primary provider in the next several days. Should you have any worsening of symptoms, or lack of improvement please be re-evaluated. If needed for significant concerns, consider 911 or ER evaluation.     BRAT Diet  This is a guide to follow when choosing foods during a stomach illness. Eating bland foods like these for 24-48 hours will help get some sustenance in your stomach without likely irritating your stomach. Of course, if you are allergic, or unable to tolerate some of these foods you should avoid them, as this is only a guide. These type foods do not have a high nutrient value but are typically gentle on the stomach.    BRAT stands for Bananas, Rice, Apples/Apple Sauce and Toast.   However, these are not the only foods you can eat while following a BRAT diet. You may also eat: Dry Cereal, Saltine Crackers, Oatmeal, Plain Potatoes, & Broth soups.   Things you should AVOID: Dairy Products, Sugars, Fried or Spicy foods, Alcohol and caffeine.     You should always make sure you are also drinking plenty of fluids including: Water, Broth, Sports drinks, & diluted fruit juice.

## 2024-08-14 NOTE — PROGRESS NOTES
[facility]        NAME: Rufina Joshua is a 58 y.o. female  : 1965    MRN: [id number]  DATE: 2024  TIME: 6:21 PM    Assessment and Plan   Nausea vomiting and diarrhea [R11.2, R19.7]  1. Nausea vomiting and diarrhea  Ambulatory Referral to Gastroenterology      2. Nausea  ondansetron (ZOFRAN-ODT) dispersible tablet 4 mg            Patient Instructions       Follow up with PCP in 3-5 days.  Proceed to  ER if symptoms worsen.    If tests are performed, our office will contact you with results only if changes need to made to the care plan discussed with you at the visit. You can review your full results on [facility].    Chief Complaint     Chief Complaint   Patient presents with   • Vomiting     Vomited x1 and had diarrhea x1 today at 10:15. None since. Had previous episode a few weeks ago.          History of Present Illness       X1 episode of diarrhea and x1 episode of vomiting at 10:15AM. Patient has history of similar in the past. She also has a history of impaired glucose for which she is being treated/monitored by her PCP.She reports feeling nauseated, and headache. She reports her vomiting was yellow. She had yogurt and banana this AM for breakfast and vomited after this. She has not had any fevers or chills. At home she has not taken anything for her symptoms. She did not note any blood in stools/emesis. She has not recently been on any antibiotics. She was recently in [place] at the end of July. She does use Tums several times throughout the week and also uses miralax when needed because she does have episode of constipation and also episodes of diarrhea so only uses this when needed and reports last was about 1 week ago. She has seen GI in the past several years ago. She had prior EGD/Colonoscopies. History of H-Pylori.     Previously seen [facility] at [address] [facility][note 3] [id number]. Will place referral for GI for follow up given that the  patient reports ongoing issues and also reports intermittent episodes of constipation/diarrhea.         Review of Systems   Review of Systems   Constitutional:  Positive for appetite change. Negative for chills, fatigue and fever.   HENT:  Negative for congestion, postnasal drip, rhinorrhea, sinus pressure, sinus pain, sore throat and trouble swallowing.    Respiratory:  Negative for cough and shortness of breath.    Gastrointestinal:  Positive for diarrhea, nausea and vomiting. Negative for abdominal pain, blood in stool and constipation.   Skin:  Negative for color change and rash.   Neurological:  Positive for headaches. Negative for dizziness and light-headedness.         Current Medications       Current Outpatient Medications:   •  albuterol (PROVENTIL HFA,VENTOLIN HFA) 90 mcg/act inhaler, inhale 2 puffs by mouth and INTO THE LUNGS every 4 hours if needed for wheezingStrength: 108 (90 Base) MCG/ACT, Disp: 18 g, Rfl: 5  •  calcium carbonate (TUMS) 500 mg chewable tablet, Chew 2 tablets if needed, Disp: , Rfl:   •  polyethylene glycol (GLYCOLAX) 17 GM/SCOOP powder, Take 17 g by mouth daily, Disp: , Rfl:   •  Wheat Dextrin (BENEFIBER DRINK MIX PO), Take 1 packet by mouth in the morning, Disp: , Rfl:   •  fluticasone (FLONASE) 50 mcg/act nasal spray, 1 spray into each nostril daily (Patient not taking: Reported on 8/14/2024), Disp: 11.1 mL, Rfl: 0  •  promethazine-dextromethorphan (PHENERGAN-DM) 6.25-15 mg/5 mL oral syrup, Take 5 mL by mouth every 4 (four) hours as needed for cough, Disp: 180 mL, Rfl: 0  No current facility-administered medications for this visit.    Current Allergies     Allergies as of 08/14/2024   • (No Known Allergies)            The following portions of the patient's history were reviewed and updated as appropriate: allergies, current medications, past family history, past medical history, past social history, past surgical history and problem list.     Past Medical History:   Diagnosis Date  "  • Anxiety    • Breast cancer (HCC)    • Cancer (HCC)     breast   • COVID-19    • Depression    • History of chemotherapy    • Kidney stone    • Pre-diabetes        Past Surgical History:   Procedure Laterality Date   • AUGMENTATION MAMMAPLASTY     • BREAST SURGERY Bilateral    • LIPOSUCTION     • MASTECTOMY     • CO SUCTION ASSISTED LIPECTOMY TRUNK N/A 05/26/2023    Procedure: LIPOSUCTION ABDOMEN/FLANKS;  Surgeon: Ronnie Vargas MD;  Location:  MAIN OR;  Service: Plastics       Family History   Problem Relation Age of Onset   • Asthma Mother    • Diabetes Father    • Hypertension Father    • Alzheimer's disease Father          Medications have been verified.        Objective   /80   Pulse 93   Temp (!) 97.1 °F (36.2 °C)   Resp 20   Ht 5' 3\" (1.6 m)   Wt 71.2 kg (157 lb)   SpO2 98%   BMI 27.81 kg/m²        Physical Exam     Physical Exam  Vitals and nursing note reviewed.   Constitutional:       General: She is not in acute distress.     Appearance: Normal appearance. She is normal weight. She is not ill-appearing.   HENT:      Head: Normocephalic and atraumatic.      Right Ear: Ear canal and external ear normal.      Left Ear: Ear canal and external ear normal.      Ears:      Comments: Large amount of wax in b/l canals.      Nose: Nose normal.      Mouth/Throat:      Lips: Pink.      Mouth: Mucous membranes are moist.      Pharynx: Oropharynx is clear.   Eyes:      Extraocular Movements: Extraocular movements intact.      Conjunctiva/sclera: Conjunctivae normal.      Pupils: Pupils are equal, round, and reactive to light.   Cardiovascular:      Rate and Rhythm: Normal rate and regular rhythm.      Pulses: Normal pulses.      Heart sounds: Normal heart sounds.   Pulmonary:      Effort: Pulmonary effort is normal.      Breath sounds: Normal breath sounds.   Abdominal:      General: Abdomen is flat. Bowel sounds are normal.      Palpations: Abdomen is soft.      Tenderness: There is no abdominal " tenderness.      Comments: Small fat pad in the center of her abdomen from prior surgery.    Musculoskeletal:         General: Normal range of motion.      Cervical back: Full passive range of motion without pain, normal range of motion and neck supple.   Skin:     General: Skin is warm and dry.      Capillary Refill: Capillary refill takes less than 2 seconds.      Findings: No rash.   Neurological:      General: No focal deficit present.      Mental Status: She is alert and oriented to person, place, and time.   Psychiatric:         Mood and Affect: Mood normal.         Behavior: Behavior normal.

## 2025-02-03 ENCOUNTER — OFFICE VISIT (OUTPATIENT)
Dept: URGENT CARE | Facility: MEDICAL CENTER | Age: 60
End: 2025-02-03
Payer: COMMERCIAL

## 2025-02-03 VITALS
DIASTOLIC BLOOD PRESSURE: 64 MMHG | HEART RATE: 94 BPM | SYSTOLIC BLOOD PRESSURE: 100 MMHG | OXYGEN SATURATION: 95 % | RESPIRATION RATE: 16 BRPM | TEMPERATURE: 98 F

## 2025-02-03 DIAGNOSIS — R19.7 NAUSEA VOMITING AND DIARRHEA: Primary | ICD-10-CM

## 2025-02-03 DIAGNOSIS — J06.9 VIRAL URI WITH COUGH: ICD-10-CM

## 2025-02-03 DIAGNOSIS — R11.2 NAUSEA VOMITING AND DIARRHEA: Primary | ICD-10-CM

## 2025-02-03 PROCEDURE — 99213 OFFICE O/P EST LOW 20 MIN: CPT

## 2025-02-03 RX ORDER — ONDANSETRON 4 MG/1
4 TABLET, ORALLY DISINTEGRATING ORAL EVERY 8 HOURS PRN
Qty: 20 TABLET | Refills: 0 | Status: SHIPPED | OUTPATIENT
Start: 2025-02-03

## 2025-02-03 RX ORDER — BENZONATATE 200 MG/1
200 CAPSULE ORAL 3 TIMES DAILY PRN
Qty: 20 CAPSULE | Refills: 0 | Status: SHIPPED | OUTPATIENT
Start: 2025-02-03

## 2025-02-03 NOTE — PROGRESS NOTES
[facility]        NAME: Rufina Joshua is a 59 y.o. female  : 1965    MRN: [id number]  DATE: February 3, 2025  TIME: 9:53 AM    Assessment and Plan   Nausea vomiting and diarrhea [R11.2, R19.7]  1. Nausea vomiting and diarrhea  ondansetron (ZOFRAN-ODT) 4 mg disintegrating tablet      2. Viral URI with cough  benzonatate (TESSALON) 200 MG capsule            Patient Instructions   At this time you have been diagnosed with a Viral Infection (COVID, influzena, HMPV, RSV are more well known types of viruses) which your body will fight off in about 7-14 days.  Antibiotics are not indicated for viral infections and taking antibiotics while you have a viral infection will not be of benefit and can actually affect the normal good bacteria that you have in your body.  Viruses are self limiting meaning your body fights it off given sufficient time to do so.  At times, you can have a secondary infection while recovering from the viral infection. If this happens, return to be reseen.   For viral illnesses, the recommendation is for supportive care which would consists of the following:  For decongestion:  2nd Generation antihistamines with a decongestant such as:   Claritin-D (Loratadine with Pseudoephedrine) or  Zyrtec-D (Cetirizine with Pseudoephedrine) or   Allegra-D (Fexofenadine with Pseudoephedrine) or   Decongestant alone: Pseudoephedrine 60mg PO every 4-6 hours for nasal congestion. Max 240mg in 24 hour period  Claritin or Zyrtec plus -Coricidin HBP (Safe for when you have high blood pressure)  Nasal corticosteroid: examples are Flonase or Nasacort.  Nasal antihistamine: Astepro  Nasal saline irrigation  Humidified air  Warm moist air such as a hot cup of water in a mug, sit at the dining room table with the mug on the table, put a towel over your head to cover over the mug and breath in the warm steam (don't drink the fluid in case you have mucus that drips in)  Vicks Vapor Rub  Mucinex as an  expectorant  For Cough or sore throat:  Salt water gurgle  Teaspoon of Honey up to 3x/day  Chloraseptic spray  Throat lozenges  Over the Counter Tylenol or Ibuprofen  Dextromethorphan 30mg PO every 6-8 hours for cough. max 120 mg in 24 hour period OR Tessalon Perles    Also to help boost your immune system while you are ill:  Take Zinc 50 mg every 12 hours for the next week (the dose is important so do not just take a multivitamin with Zinc or an over-the-counter cold med with Zinc such as Airborne or Zicam, as that will not give you the sufficient dose).    You should also take vitamin D3 5000 i.u.s per day for the next 1 week.  Vitamin-C 1 g twice daily (and again dosages are important, do not think you are getting enough vitamin C just by drinking extra orange juice).      Diarrhea and Vomiting are ways that your body tries to get rid of substances that it does not want sitting in you.  Allow for the diarrhea to continue to get rid of what your body does not want in there.  Take the Zofran as needed for nausea.   Slowly work your way to more complex and regular foods      Follow up with Primary Care Provider in 3-5 days if not improving.  Proceed to Emergency Department if symptoms worsen.    If tests have been performed at [facility], our office will contact you with results if changes need to be made to the care plan discussed with you at the visit.  You can review your full results on [facility] MyChart.    Chief Complaint     Chief Complaint   Patient presents with   • Cold Like Symptoms     Cough,diarrhea,vomiting,fevers,started Friday    • Diarrhea         History of Present Illness       Nausea and vomiting starting about 3 days ago along with a cough.  States she vomited once 2 nights ago but no further episodes since then.  Continues to feel nauseous especially when her  tried to eat or drink anything.  States her diarrhea is slowly becoming more toothpaste like and feels that it is improving.  She  has been taking Robitussin for her cough.    Diarrhea   Associated symptoms include coughing and vomiting. Pertinent negatives include no abdominal pain, arthralgias, chills or fever.       Review of Systems   Review of Systems   Constitutional:  Negative for chills and fever.   HENT:  Positive for postnasal drip. Negative for congestion, ear pain, rhinorrhea and sore throat.    Eyes:  Negative for pain and visual disturbance.   Respiratory:  Positive for cough. Negative for shortness of breath.    Cardiovascular:  Negative for chest pain and palpitations.   Gastrointestinal:  Positive for diarrhea, nausea and vomiting. Negative for abdominal pain.   Genitourinary:  Negative for dysuria and hematuria.   Musculoskeletal:  Negative for arthralgias and back pain.   Skin:  Negative for color change and rash.   Neurological:  Negative for seizures and syncope.   All other systems reviewed and are negative.        Current Medications       Current Outpatient Medications:   •  albuterol (PROVENTIL HFA,VENTOLIN HFA) 90 mcg/act inhaler, inhale 2 puffs by mouth and INTO THE LUNGS every 4 hours if needed for wheezingStrength: 108 (90 Base) MCG/ACT, Disp: 18 g, Rfl: 5  •  benzonatate (TESSALON) 200 MG capsule, Take 1 capsule (200 mg total) by mouth 3 (three) times a day as needed for cough, Disp: 20 capsule, Rfl: 0  •  ondansetron (ZOFRAN-ODT) 4 mg disintegrating tablet, Take 1 tablet (4 mg total) by mouth every 8 (eight) hours as needed for nausea or vomiting, Disp: 20 tablet, Rfl: 0  •  calcium carbonate (TUMS) 500 mg chewable tablet, Chew 2 tablets if needed, Disp: , Rfl:   •  fluticasone (FLONASE) 50 mcg/act nasal spray, 1 spray into each nostril daily (Patient not taking: Reported on 8/14/2024), Disp: 11.1 mL, Rfl: 0  •  polyethylene glycol (GLYCOLAX) 17 GM/SCOOP powder, Take 17 g by mouth daily, Disp: , Rfl:   •  promethazine-dextromethorphan (PHENERGAN-DM) 6.25-15 mg/5 mL oral syrup, Take 5 mL by mouth every 4 (four)  hours as needed for cough, Disp: 180 mL, Rfl: 0  •  Wheat Dextrin (BENEFIBER DRINK MIX PO), Take 1 packet by mouth in the morning, Disp: , Rfl:     Current Allergies     Allergies as of 02/03/2025   • (No Known Allergies)            The following portions of the patient's history were reviewed and updated as appropriate: allergies, current medications, past family history, past medical history, past social history, past surgical history and problem list.     Past Medical History:   Diagnosis Date   • Anxiety    • Breast cancer (HCC)    • Cancer (HCC)     breast   • COVID-19    • Depression    • History of chemotherapy    • Kidney stone    • Pre-diabetes        Past Surgical History:   Procedure Laterality Date   • AUGMENTATION MAMMAPLASTY     • BREAST SURGERY Bilateral    • LIPOSUCTION     • MASTECTOMY     • MT SUCTION ASSISTED LIPECTOMY TRUNK N/A 05/26/2023    Procedure: LIPOSUCTION ABDOMEN/FLANKS;  Surgeon: Ronnie Vargas MD;  Location:  MAIN OR;  Service: Plastics       Family History   Problem Relation Age of Onset   • Asthma Mother    • Diabetes Father    • Hypertension Father    • Alzheimer's disease Father          Medications have been verified.        Objective   /64 (BP Location: Left arm, Patient Position: Sitting, Cuff Size: Standard)   Pulse 94   Temp 98 °F (36.7 °C)   Resp 16   SpO2 95%   No LMP recorded. Patient is postmenopausal.       Physical Exam     Physical Exam  Vitals and nursing note reviewed.   Constitutional:       Appearance: Normal appearance.   HENT:      Head: Normocephalic and atraumatic.   Cardiovascular:      Rate and Rhythm: Normal rate.      Pulses: Normal pulses.      Heart sounds: Normal heart sounds.   Pulmonary:      Effort: Pulmonary effort is normal.      Breath sounds: Normal breath sounds.   Abdominal:      General: Bowel sounds are normal.      Palpations: Abdomen is soft.      Tenderness: There is no abdominal tenderness.   Musculoskeletal:         General:  Normal range of motion.      Cervical back: Normal range of motion and neck supple.   Skin:     General: Skin is warm and dry.      Capillary Refill: Capillary refill takes less than 2 seconds.   Neurological:      General: No focal deficit present.      Mental Status: She is alert and oriented to person, place, and time. Mental status is at baseline.      Sensory: No sensory deficit.      Motor: No weakness.   Psychiatric:         Mood and Affect: Mood normal.         Behavior: Behavior normal.         Thought Content: Thought content normal.

## 2025-02-03 NOTE — PATIENT INSTRUCTIONS
At this time you have been diagnosed with a Viral Infection (COVID, influzena, HMPV, RSV are more well known types of viruses) which your body will fight off in about 7-14 days.  Antibiotics are not indicated for viral infections and taking antibiotics while you have a viral infection will not be of benefit and can actually affect the normal good bacteria that you have in your body.  Viruses are self limiting meaning your body fights it off given sufficient time to do so.  At times, you can have a secondary infection while recovering from the viral infection. If this happens, return to be reseen.   For viral illnesses, the recommendation is for supportive care which would consists of the following:  For decongestion:  2nd Generation antihistamines with a decongestant such as:   Claritin-D (Loratadine with Pseudoephedrine) or  Zyrtec-D (Cetirizine with Pseudoephedrine) or   Allegra-D (Fexofenadine with Pseudoephedrine) or   Decongestant alone: Pseudoephedrine 60mg PO every 4-6 hours for nasal congestion. Max 240mg in 24 hour period  Claritin or Zyrtec plus -Coricidin HBP (Safe for when you have high blood pressure)  Nasal corticosteroid: examples are Flonase or Nasacort.  Nasal antihistamine: Astepro  Nasal saline irrigation  Humidified air  Warm moist air such as a hot cup of water in a mug, sit at the dining room table with the mug on the table, put a towel over your head to cover over the mug and breath in the warm steam (don't drink the fluid in case you have mucus that drips in)  Vicks Vapor Rub  Mucinex as an expectorant  For Cough or sore throat:  Salt water gurgle  Teaspoon of Honey up to 3x/day  Chloraseptic spray  Throat lozenges  Over the Counter Tylenol or Ibuprofen  Dextromethorphan 30mg PO every 6-8 hours for cough. max 120 mg in 24 hour period OR Tessalon Perles    Also to help boost your immune system while you are ill:  Take Zinc 50 mg every 12 hours for the next week (the dose is important so do not  just take a multivitamin with Zinc or an over-the-counter cold med with Zinc such as Airborne or Zicam, as that will not give you the sufficient dose).    You should also take vitamin D3 5000 i.u.s per day for the next 1 week.  Vitamin-C 1 g twice daily (and again dosages are important, do not think you are getting enough vitamin C just by drinking extra orange juice).      Diarrhea and Vomiting are ways that your body tries to get rid of substances that it does not want sitting in you.  Allow for the diarrhea to continue to get rid of what your body does not want in there.  Take the Zofran as needed for nausea.   Slowly work your way to more complex and regular foods      Follow up with Primary Care Provider in 3-5 days if not improving.  Proceed to Emergency Department if symptoms worsen.    If tests have been performed at [facility], our office will contact you with results if changes need to be made to the care plan discussed with you at the visit.  You can review your full results on [facility] MyChart.

## 2025-02-03 NOTE — LETTER
February 3, 2025     Patient: Rufina Joshua   YOB: 1965   Date of Visit: 2/3/2025       To Whom it May Concern:    Rufina Joshua was seen in my clinic on 2/3/2025. She may return to work on Wednesday 2/5/2025 as long as fever (Fever is >100.4F) free for 24 hours without use of fever reducing medication. If fever is present, must wait an additional 24 hours to be fever free before returning to school/work.      If you have any questions or concerns, please don't hesitate to call.         Sincerely,          FEI Thacker        CC: No Recipients

## 2025-02-04 ENCOUNTER — OFFICE VISIT (OUTPATIENT)
Dept: FAMILY MEDICINE CLINIC | Facility: CLINIC | Age: 60
End: 2025-02-04
Payer: COMMERCIAL

## 2025-02-04 VITALS
HEART RATE: 92 BPM | HEIGHT: 63 IN | DIASTOLIC BLOOD PRESSURE: 65 MMHG | OXYGEN SATURATION: 96 % | TEMPERATURE: 98.9 F | SYSTOLIC BLOOD PRESSURE: 107 MMHG | BODY MASS INDEX: 27.64 KG/M2 | WEIGHT: 156 LBS

## 2025-02-04 DIAGNOSIS — C50.919 MALIGNANT NEOPLASM OF FEMALE BREAST, UNSPECIFIED ESTROGEN RECEPTOR STATUS, UNSPECIFIED LATERALITY, UNSPECIFIED SITE OF BREAST (HCC): ICD-10-CM

## 2025-02-04 DIAGNOSIS — J45.20 MILD INTERMITTENT ASTHMA, UNCOMPLICATED: ICD-10-CM

## 2025-02-04 DIAGNOSIS — B34.9 VIRAL INFECTION, UNSPECIFIED: Primary | ICD-10-CM

## 2025-02-04 DIAGNOSIS — J11.1 INFLUENZA: ICD-10-CM

## 2025-02-04 LAB
SARS-COV-2 AG UPPER RESP QL IA: NEGATIVE
VALID CONTROL: NORMAL

## 2025-02-04 PROCEDURE — 87811 SARS-COV-2 COVID19 W/OPTIC: CPT | Performed by: FAMILY MEDICINE

## 2025-02-04 PROCEDURE — 99213 OFFICE O/P EST LOW 20 MIN: CPT | Performed by: FAMILY MEDICINE

## 2025-02-04 RX ORDER — HYOSCYAMINE SULFATE 0.125 MG
0.12 TABLET ORAL EVERY 4 HOURS PRN
Qty: 25 TABLET | Refills: 0 | Status: SHIPPED | OUTPATIENT
Start: 2025-02-04

## 2025-02-04 NOTE — ASSESSMENT & PLAN NOTE
A rapid antigen COVID-19 test was done in the office today.  The test was negative.  Patient was informed of the results.    Orders:    Poct Covid 19 Rapid Antigen Test

## 2025-02-04 NOTE — ASSESSMENT & PLAN NOTE
Patient's symptoms are compatible with influenza.  Her symptoms began on Friday, December 31.  Therefore, she has had symptoms for greater than 48 hours and is not a candidate for Tamiflu.  Treatment at this time will be aimed for symptomatic relief.  The patient may continue Zofran as needed for nausea.  Her stomach cramps, I prescribed Levsin.  She will take 1 every 4 hours as needed for abdominal pain/cramps.  This should also help with the diarrhea.  I advised the patient to drink plenty of fluids and rest.  She may take Tylenol for fever or headaches.  I asked her to follow a clear liquid diet today and advance to brat diet.  As she improves she may slowly advance her diet.  She was given a note for work.  I advised the patient to call if no improvement or worsens.  Patient was also advised to make sure she gets influenza vaccine next fall.  Orders:    hyoscyamine (ANASPAZ,LEVSIN) 0.125 MG tablet; Take 1 tablet (0.125 mg total) by mouth every 4 (four) hours as needed (abdominal pain or cramps)

## 2025-02-04 NOTE — PROGRESS NOTES
Name: Rufina Joshua      : 1965      MRN: [id number]  Encounter Provider: Reji De Guzman DO  Encounter Date: 2025   Encounter department: [facility]  :  Assessment & Plan  Viral infection, unspecified  A rapid antigen COVID-19 test was done in the office today.  The test was negative.  Patient was informed of the results.    Orders:    Poct Covid 19 Rapid Antigen Test    Influenza  Patient's symptoms are compatible with influenza.  Her symptoms began on .  Therefore, she has had symptoms for greater than 48 hours and is not a candidate for Tamiflu.  Treatment at this time will be aimed for symptomatic relief.  The patient may continue Zofran as needed for nausea.  Her stomach cramps, I prescribed Levsin.  She will take 1 every 4 hours as needed for abdominal pain/cramps.  This should also help with the diarrhea.  I advised the patient to drink plenty of fluids and rest.  She may take Tylenol for fever or headaches.  I asked her to follow a clear liquid diet today and advance to brat diet.  As she improves she may slowly advance her diet.  She was given a note for work.  I advised the patient to call if no improvement or worsens.  Patient was also advised to make sure she gets influenza vaccine next fall.  Orders:    hyoscyamine (ANASPAZ,LEVSIN) 0.125 MG tablet; Take 1 tablet (0.125 mg total) by mouth every 4 (four) hours as needed (abdominal pain or cramps)    Malignant neoplasm of female breast, unspecified estrogen receptor status, unspecified laterality, unspecified site of breast (HCC)  Patient has history of breast cancer with no evidence of recurrence or metastasis.       Mild intermittent asthma, uncomplicated  Patient has asthma which is currently stable.  She does have an inhaler at home to use as needed.              History of Present Illness   There is a 59-year-old white female who presents to the office today complaining of not feeling well.  Her  "symptoms began on Friday, January 31.  She complains of nausea, vomiting, diarrhea, fever, chills, cough, myalgias, and headache.  She tells me she had explosive diarrhea earlier today.  She tells me she can hardly sleep at night due to her abdominal pain.  She has been taking Pepto-Bismol for this.  She went to the urgent care center yesterday.  She was prescribed Zofran for the nausea and Tessalon Perles.  She tells me that yesterday she had 2 episodes of diarrhea.  The following day she had none.  Prior to that she was having 2-3 episodes of diarrhea per day.  She tells me she came in today after being seen at the urgent care because she wanted to a more thorough evaluation.  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  She was not vaccinated for influenza.      Review of Systems   Constitutional:  Positive for activity change, appetite change, chills, fatigue and fever.   HENT:  Positive for congestion. Negative for hearing loss.    Respiratory:  Positive for cough. Negative for shortness of breath and wheezing.    Gastrointestinal:  Positive for abdominal pain, diarrhea, nausea and vomiting. Negative for abdominal distention, anal bleeding, blood in stool and constipation.   Musculoskeletal:  Positive for myalgias.   Neurological:  Positive for headaches.       Objective   /65   Pulse 92   Temp 98.9 °F (37.2 °C) (Tympanic)   Ht 5' 3\" (1.6 m)   Wt 70.8 kg (156 lb)   SpO2 96%   BMI 27.63 kg/m²      Physical Exam  Vitals reviewed.   Constitutional:       Comments: This is a 59-year-old  female who appears her stated age.  The patient looks ill but does not appear toxic.  She was in no apparent distress   HENT:      Head: Normocephalic and atraumatic.      Right Ear: Tympanic membrane, ear canal and external ear normal. There is impacted cerumen.      Left Ear: Tympanic membrane, ear canal and external ear normal. There is impacted cerumen.      Mouth/Throat:      " Mouth: Mucous membranes are moist.      Pharynx: Oropharynx is clear. No oropharyngeal exudate or posterior oropharyngeal erythema.   Eyes:      General: No scleral icterus.        Right eye: No discharge.         Left eye: No discharge.      Conjunctiva/sclera: Conjunctivae normal.      Pupils: Pupils are equal, round, and reactive to light.   Cardiovascular:      Rate and Rhythm: Normal rate and regular rhythm.      Heart sounds: Normal heart sounds. No murmur heard.     No friction rub. No gallop.   Pulmonary:      Effort: Pulmonary effort is normal. No respiratory distress.      Breath sounds: Normal breath sounds. No stridor. No wheezing, rhonchi or rales.   Abdominal:      General: Bowel sounds are normal. There is no distension.      Palpations: Abdomen is soft. There is no mass.      Tenderness: There is abdominal tenderness. There is no right CVA tenderness, left CVA tenderness, guarding or rebound.      Hernia: No hernia is present.      Comments: There is mild diffuse tenderness noted to palpation.  There was no guarding, rebound, or rigidity.  There were no peritoneal signs elicited.   Musculoskeletal:      Cervical back: Neck supple.   Lymphadenopathy:      Cervical: No cervical adenopathy.

## 2025-02-04 NOTE — ASSESSMENT & PLAN NOTE
Patient has asthma which is currently stable.  She does have an inhaler at home to use as needed.

## 2025-03-06 PROBLEM — J11.1 INFLUENZA: Status: RESOLVED | Noted: 2025-02-04 | Resolved: 2025-03-06

## 2025-07-15 ENCOUNTER — OFFICE VISIT (OUTPATIENT)
Dept: URGENT CARE | Facility: CLINIC | Age: 60
End: 2025-07-15
Payer: COMMERCIAL

## 2025-07-15 VITALS
TEMPERATURE: 97.7 F | OXYGEN SATURATION: 97 % | HEART RATE: 90 BPM | WEIGHT: 159 LBS | DIASTOLIC BLOOD PRESSURE: 70 MMHG | BODY MASS INDEX: 28.17 KG/M2 | SYSTOLIC BLOOD PRESSURE: 114 MMHG | RESPIRATION RATE: 18 BRPM
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
## 2025-07-15 DIAGNOSIS — L03.116 CELLULITIS OF LEFT LEG: Primary | ICD-10-CM

## 2025-07-15 DIAGNOSIS — S80.262A INSECT BITE OF LEFT KNEE, INITIAL ENCOUNTER: ICD-10-CM

## 2025-07-15 PROCEDURE — 99213 OFFICE O/P EST LOW 20 MIN: CPT | Performed by: NURSE PRACTITIONER

## 2025-07-15 RX ORDER — DOXYCYCLINE 100 MG/1
100 CAPSULE ORAL 2 TIMES DAILY
Qty: 14 CAPSULE | Refills: 0 | Status: SHIPPED | OUTPATIENT
Start: 2025-07-15 | End: 2025-07-22

## 2025-07-15 RX ORDER — HYDROCORTISONE 25 MG/G
CREAM TOPICAL 2 TIMES DAILY
Qty: 20 G | Refills: 0 | Status: SHIPPED | OUTPATIENT
Start: 2025-07-15

## 2025-07-15 NOTE — PROGRESS NOTES
[facility]  Name: Rufina Joshua      : 1965      MRN: [id number]  Encounter Provider: FEI Joseph  Encounter Date: 7/15/2025   Encounter department: [facility]AN [facility]VAR[facility]  :  Assessment & Plan  Cellulitis of left leg    Orders:    doxycycline monohydrate (MONODOX) 100 mg capsule; Take 1 capsule (100 mg total) by mouth 2 (two) times a day for 7 days    hydrocortisone 2.5 % cream; Apply topically 2 (two) times a day    Insect bite of left knee, initial encounter    Orders:    doxycycline monohydrate (MONODOX) 100 mg capsule; Take 1 capsule (100 mg total) by mouth 2 (two) times a day for 7 days    hydrocortisone 2.5 % cream; Apply topically 2 (two) times a day        Patient Instructions  Follow up with PCP in 3-5 days.  Proceed to  ER if symptoms worsen.    If tests are performed, our office will contact you with results only if changes need to made to the care plan discussed with you at the visit. [facility].    Do not take the doxycycline with in 2 hours of taking your vitamins.     The antibiotic you  have been prescribed causes sensitivity to the sun.  Wear sun screen and a hat.   You have been prescribed doxycycline for cellulitis   You have been prescribed hydrocortisone cream for itching.  Apply warm compresses to the area several times a day    Follow up with your PCP in 3-5 days  Go to the ED if symptoms worsen         Chief Complaint:   Chief Complaint   Patient presents with    Knee Pain     Itching and redness behind left knee ,started last evening      History of Present Illness   This is a 59 year old female who comes to care now with c/o redness, itchiness, insect bite left popliteal area that started yesterday. She denies finding insect or tick.  She states the area is itchy. Denies pain, fevers.  PMH is listed and reviewed. She has not attempted any treatment herself.             Review of Systems    Constitutional: Negative.    HENT: Negative.     Eyes: Negative.    Respiratory: Negative.     Cardiovascular: Negative.    Gastrointestinal: Negative.    Endocrine: Negative.    Genitourinary: Negative.    Musculoskeletal: Negative.    Skin:  Positive for color change and wound.   Allergic/Immunologic: Negative.    Neurological: Negative.    Hematological: Negative.    Psychiatric/Behavioral: Negative.       Past Medical History   Past Medical History[1]  Past Surgical History[2]  Family History[3]  she reports that she has never smoked. She has never been exposed to tobacco smoke. She has never used smokeless tobacco. She reports current alcohol use. She reports that she does not use drugs.  Current Outpatient Medications   Medication Instructions    albuterol (PROVENTIL HFA,VENTOLIN HFA) 90 mcg/act inhaler inhale 2 puffs by mouth and INTO THE LUNGS every 4 hours if needed for wheezingStrength: 108 (90 Base) MCG/ACT    benzonatate (TESSALON) 200 mg, Oral, 3 times daily PRN    calcium carbonate (TUMS) 500 mg chewable tablet 2 tablets, As needed    doxycycline monohydrate (MONODOX) 100 mg, Oral, 2 times daily    fluticasone (FLONASE) 50 mcg/act nasal spray 1 spray, Nasal, Daily    hydrocortisone 2.5 % cream Topical, 2 times daily    hyoscyamine (ANASPAZ,LEVSIN) 0.125 mg, Oral, Every 4 hours PRN    ondansetron (ZOFRAN-ODT) 4 mg, Oral, Every 8 hours PRN    polyethylene glycol (GLYCOLAX) 17 g, Daily    promethazine-dextromethorphan (PHENERGAN-DM) 6.25-15 mg/5 mL oral syrup 5 mL, Oral, Every 4 hours PRN    Wheat Dextrin (BENEFIBER DRINK MIX PO) 1 packet, Daily   Allergies[4]     Objective   /70   Pulse 90   Temp 97.7 °F (36.5 °C)   Resp 18   Wt 72.1 kg (159 lb)   SpO2 97%   BMI 28.17 kg/m²      Physical Exam  Vitals and nursing note reviewed.   Constitutional:       General: She is not in acute distress.     Appearance: Normal appearance. She is normal weight. She is not ill-appearing, toxic-appearing or  "diaphoretic.   HENT:      Head: Normocephalic and atraumatic.      Nose: Nose normal.      Mouth/Throat:      Mouth: Mucous membranes are moist.     Eyes:      Extraocular Movements: Extraocular movements intact.       Cardiovascular:      Rate and Rhythm: Normal rate.   Pulmonary:      Effort: Pulmonary effort is normal.     Musculoskeletal:         General: Normal range of motion.      Cervical back: Normal range of motion.     Skin:     General: Skin is warm and dry.      Capillary Refill: Capillary refill takes less than 2 seconds.      Findings: Erythema present.          Neurological:      General: No focal deficit present.      Mental Status: She is alert and oriented to person, place, and time.     Psychiatric:         Mood and Affect: Mood normal.         Behavior: Behavior normal.         Thought Content: Thought content normal.         Judgment: Judgment normal.         Portions of the record may have been created with voice recognition software.  Occasional wrong word or \"sound a like\" substitutions may have occurred due to the inherent limitations of voice recognition software.  Read the chart carefully and recognize, using context, where substitutions have occurred.         [1]   Past Medical History:  Diagnosis Date    Anxiety     Breast cancer (HCC)     Cancer (HCC)     breast    COVID-19     Depression     History of chemotherapy     Kidney stone     Pre-diabetes    [2]   Past Surgical History:  Procedure Laterality Date    AUGMENTATION MAMMAPLASTY      BREAST SURGERY Bilateral     LIPOSUCTION      MASTECTOMY      VT SUCTION ASSISTED LIPECTOMY TRUNK N/A 05/26/2023    Procedure: LIPOSUCTION ABDOMEN/FLANKS;  Surgeon: Ronnie Vargas MD;  Location:  MAIN OR;  Service: Plastics   [3]   Family History  Problem Relation Name Age of Onset    Asthma Mother      Diabetes Father      Hypertension Father      Alzheimer's disease Father     [4] No Known Allergies    "

## 2025-07-18 ENCOUNTER — OFFICE VISIT (OUTPATIENT)
Dept: FAMILY MEDICINE CLINIC | Facility: CLINIC | Age: 60
End: 2025-07-18
Payer: COMMERCIAL

## 2025-07-18 VITALS
HEART RATE: 72 BPM | DIASTOLIC BLOOD PRESSURE: 56 MMHG | BODY MASS INDEX: 27.72 KG/M2 | WEIGHT: 156.5 LBS | OXYGEN SATURATION: 98 % | TEMPERATURE: 97.4 F | SYSTOLIC BLOOD PRESSURE: 113 MMHG

## 2025-07-18 DIAGNOSIS — A69.20 ERYTHEMA CHRONICUM MIGRANS: Primary | ICD-10-CM

## 2025-07-18 DIAGNOSIS — J45.20 MILD INTERMITTENT ASTHMA, UNSPECIFIED WHETHER COMPLICATED: ICD-10-CM

## 2025-07-18 DIAGNOSIS — N89.8 VAGINAL ITCHING: ICD-10-CM

## 2025-07-18 PROCEDURE — 99214 OFFICE O/P EST MOD 30 MIN: CPT | Performed by: FAMILY MEDICINE

## 2025-07-18 RX ORDER — ALBUTEROL SULFATE 90 UG/1
INHALANT RESPIRATORY (INHALATION)
Qty: 18 G | Refills: 5 | Status: SHIPPED | OUTPATIENT
Start: 2025-07-18

## 2025-07-18 RX ORDER — DOXYCYCLINE 100 MG/1
100 CAPSULE ORAL 2 TIMES DAILY
Qty: 14 CAPSULE | Refills: 0 | Status: SHIPPED | OUTPATIENT
Start: 2025-07-18 | End: 2025-07-25

## 2025-07-18 RX ORDER — CLOBETASOL PROPIONATE 0.5 MG/G
CREAM TOPICAL 2 TIMES DAILY
Qty: 15 G | Refills: 1 | Status: SHIPPED | OUTPATIENT
Start: 2025-07-18

## 2025-07-30 PROBLEM — A69.20 ERYTHEMA CHRONICUM MIGRANS: Status: ACTIVE | Noted: 2025-07-30

## 2025-08-19 PROCEDURE — 86618 LYME DISEASE ANTIBODY: CPT | Performed by: FAMILY MEDICINE

## 2025-08-20 ENCOUNTER — TELEPHONE (OUTPATIENT)
Age: 60
End: 2025-08-20